# Patient Record
Sex: FEMALE | Race: BLACK OR AFRICAN AMERICAN | Employment: OTHER | URBAN - METROPOLITAN AREA
[De-identification: names, ages, dates, MRNs, and addresses within clinical notes are randomized per-mention and may not be internally consistent; named-entity substitution may affect disease eponyms.]

---

## 2018-09-09 ENCOUNTER — APPOINTMENT (OUTPATIENT)
Dept: GENERAL RADIOLOGY | Age: 63
End: 2018-09-09
Attending: PHYSICIAN ASSISTANT
Payer: MEDICARE

## 2018-09-09 ENCOUNTER — HOSPITAL ENCOUNTER (EMERGENCY)
Age: 63
Discharge: HOME | End: 2018-09-09
Attending: STUDENT IN AN ORGANIZED HEALTH CARE EDUCATION/TRAINING PROGRAM
Payer: MEDICARE

## 2018-09-09 VITALS
BODY MASS INDEX: 30.63 KG/M2 | HEIGHT: 60 IN | RESPIRATION RATE: 18 BRPM | SYSTOLIC BLOOD PRESSURE: 137 MMHG | TEMPERATURE: 98.2 F | HEART RATE: 89 BPM | DIASTOLIC BLOOD PRESSURE: 85 MMHG | WEIGHT: 156 LBS | OXYGEN SATURATION: 94 %

## 2018-09-09 DIAGNOSIS — M54.32 SCIATICA OF LEFT SIDE: Primary | ICD-10-CM

## 2018-09-09 PROCEDURE — 72100 X-RAY EXAM L-S SPINE 2/3 VWS: CPT

## 2018-09-09 PROCEDURE — 74011250636 HC RX REV CODE- 250/636: Performed by: STUDENT IN AN ORGANIZED HEALTH CARE EDUCATION/TRAINING PROGRAM

## 2018-09-09 PROCEDURE — 73502 X-RAY EXAM HIP UNI 2-3 VIEWS: CPT

## 2018-09-09 PROCEDURE — 96372 THER/PROPH/DIAG INJ SC/IM: CPT

## 2018-09-09 PROCEDURE — 74011250637 HC RX REV CODE- 250/637: Performed by: STUDENT IN AN ORGANIZED HEALTH CARE EDUCATION/TRAINING PROGRAM

## 2018-09-09 PROCEDURE — 99283 EMERGENCY DEPT VISIT LOW MDM: CPT

## 2018-09-09 RX ORDER — ACETAMINOPHEN 500 MG
1000 TABLET ORAL ONCE
Status: COMPLETED | OUTPATIENT
Start: 2018-09-09 | End: 2018-09-09

## 2018-09-09 RX ORDER — FENTANYL CITRATE 50 UG/ML
100 INJECTION, SOLUTION INTRAMUSCULAR; INTRAVENOUS
Status: COMPLETED | OUTPATIENT
Start: 2018-09-09 | End: 2018-09-09

## 2018-09-09 RX ORDER — HYDROCODONE BITARTRATE AND ACETAMINOPHEN 7.5; 325 MG/1; MG/1
1 TABLET ORAL
Qty: 6 TAB | Refills: 0 | Status: SHIPPED | OUTPATIENT
Start: 2018-09-09 | End: 2019-10-07

## 2018-09-09 RX ADMIN — FENTANYL CITRATE 100 MCG: 50 INJECTION, SOLUTION INTRAMUSCULAR; INTRAVENOUS at 18:41

## 2018-09-09 RX ADMIN — ACETAMINOPHEN 1000 MG: 500 TABLET ORAL at 18:41

## 2018-09-09 NOTE — ED TRIAGE NOTES
Patient arrives c/o LEFT hip/groin and leg pain, starting Friday, denies injury.   Denies redness/swelling, denies CP/SOB

## 2018-09-09 NOTE — ED PROVIDER NOTES
HPI Comments: 65 y.o. female with past medical history significant for pancreatitis, diabetes, and HTN who presents from home with chief complaint of hip pain. Patient states that she began feeling constant pain in her left hip and left buttocks that radiated throughout her left leg 2 days ago. She notes that her pain has become progressively worse and describes her symptoms as a persistent \"throbbing\" sensation with \"soreness\" in her left leg. Patient complains that her pain prevents her from being able to \"sit on the toilet seat. \"  She notes aggravation of her symptoms with ambulation and denies relief when lying down. Patient has taken Tramadol for her symptoms with no relief. She claims to have a h/o arthritis and to have had similar symptoms previously. Of note, patient recently returned from a road trip to the [place]. She denies any recent accidents or falls, fever, chills, abdominal pain, dysuria, incontinence, back pain, chest pain, and headaches. There are no other acute medical concerns at this time. Social hx: former tobacco use; negative alcohol use; negative drug use  PCP: Ginny Bob MD    Note written by Carmine Suggs, as dictated by [place] MD 6:01 PM        The history is provided by the patient. No  was used. Past Medical History:   Diagnosis Date    Diabetes ([address]     Hypertension     Pancreatitis        Past Surgical History:   Procedure Laterality Date    HX CHOLECYSTECTOMY      HX HERNIA REPAIR      HX HYSTERECTOMY      HX ORTHOPAEDIC      right shoulder    HX ORTHOPAEDIC      left shoulder    HX TUBAL LIGATION      NEUROLOGICAL PROCEDURE UNLISTED      cervical fusion         History reviewed. No pertinent family history. Social History     Social History    Marital status: SINGLE     Spouse name: N/A    Number of children: N/A    Years of education: N/A     Occupational History    Not on file.      Social History Main Topics    Smoking status: Former Smoker     Packs/day: 0.50    Smokeless tobacco: Never Used    Alcohol use No    Drug use: No    Sexual activity: Not on file     Other Topics Concern    Not on file     Social History Narrative         ALLERGIES: Latex and Morphine    Review of Systems   Constitutional: Negative for chills and fever. HENT: Negative for sore throat. Respiratory: Negative for cough and shortness of breath. Cardiovascular: Negative for chest pain. Gastrointestinal: Negative for abdominal pain and vomiting. Genitourinary: Negative for dysuria. Musculoskeletal: Positive for arthralgias (left hip pain) and gait problem. Negative for back pain. Skin: Negative for rash. Neurological: Negative for syncope and headaches. Psychiatric/Behavioral: Negative for confusion. All other systems reviewed and are negative. Vitals:    09/09/18 1709   BP: 157/86   Pulse: (!) 106   Resp: 18   Temp: 98.2 °F (36.8 °C)   SpO2: 97%   Weight: 70.8 kg (156 lb)   Height: 5' (1.524 m)            Physical Exam   Constitutional: She is oriented to person, place, and time. She appears well-developed. No distress. HENT:   Head: Normocephalic and atraumatic. Eyes: Conjunctivae and EOM are normal. Pupils are equal, round, and reactive to light. Neck: Normal range of motion. Neck supple. Cardiovascular: Normal rate, regular rhythm and normal heart sounds. No murmur heard. Pulmonary/Chest: Effort normal and breath sounds normal. No respiratory distress. Abdominal: Soft. Bowel sounds are normal. She exhibits no distension. There is no tenderness. There is no rebound. Musculoskeletal: Normal range of motion. She exhibits no edema. Left buttock and paralumbar muscular tenderness to palpation; no saddle anesthesia    Neurological: She is alert and oriented to person, place, and time. She has normal strength. No cranial nerve deficit. She exhibits normal muscle tone.  Coordination normal. Normal motor strength in all extremities   Skin: Skin is warm and dry. No rash noted. Psychiatric: She has a normal mood and affect. Her behavior is normal.   Nursing note and vitals reviewed. Note written by Carmine Chavez, as dictated by Chaz Zavala MD 6:01 PM      [facility]      ED Course       Procedures    The patient presented with hip and back pain. The patient is now resting comfortably and feels better, is alert, talkative, interactive and in no distress. The repeat examination is unremarkable and benign. The patient is neurologically intact and is ambulatory in the ED. The patient has no fever, no bowel or bladder incontinence, no saddle anesthesia, and is otherwise alert and well-appearing. The history, physical examination, and diagnostics (if any) do not suggest the presence of acute fracture, dislocation, spinal epidural abscess, acute spinal epidural bleed, cauda equina syndrome, abdominal aortic aneurysm, aortic dissection or other process requiring further testing, treatment or consultation in the emergency department. The vital signs have been stable. The patient's condition is stable and appropriate for discharge. The patient will pursue further outpatient evaluation with the primary care physician or other designated or consulting physician as indicated in the discharge instructions.

## 2018-09-09 NOTE — DISCHARGE INSTRUCTIONS
Sciatica: Care Instructions  Your Care Instructions    Sciatica (say \"yet-MC-yz-kuh\") is an irritation of one of the sciatic nerves, which come from the spinal cord in the lower back. The sciatic nerves and their branches extend down through the buttock to the foot. Sciatica can develop when an injured disc in the back presses against a spinal nerve root. Its main symptom is pain, numbness, or weakness that is often worse in the leg or foot than in the back. Sciatica often will improve and go away with time. Early treatment usually includes medicines and exercises to relieve pain. Follow-up care is a key part of your treatment and safety. Be sure to make and go to all appointments, and call your doctor if you are having problems. It's also a good idea to know your test results and keep a list of the medicines you take. How can you care for yourself at home? · Take pain medicines exactly as directed. ¨ If the doctor gave you a prescription medicine for pain, take it as prescribed. ¨ If you are not taking a prescription pain medicine, ask your doctor if you can take an over-the-counter medicine. · Use heat or ice to relieve pain. ¨ To apply heat, put a warm water bottle, heating pad set on low, or warm cloth on your back. Do not go to sleep with a heating pad on your skin. ¨ To use ice, put ice or a cold pack on the area for 10 to 20 minutes at a time. Put a thin cloth between the ice and your skin. · Avoid sitting if possible, unless it feels better than standing. · Alternate lying down with short walks. Increase your walking distance as you are able to without making your symptoms worse. · Do not do anything that makes your symptoms worse. When should you call for help? Call 911 anytime you think you may need emergency care.  For example, call if:    · You are unable to move a leg at all.   [facility] your doctor now or seek immediate medical care if:    · You have new or worse symptoms in your legs or buttocks. Symptoms may include:  ¨ Numbness or tingling. ¨ Weakness. ¨ Pain.     · You lose bladder or bowel control.    Watch closely for changes in your health, and be sure to contact your doctor if:    · You are not getting better as expected. Where can you learn more? Go to http://vincent-marlo.info/. Enter [phone] in the search box to learn more about \"Sciatica: Care Instructions. \"  Current as of: November 29, 2017  Content Version: 11.7  © 1286-6762 [employer] Care instructions adapted under license by [employer] (which disclaims liability or warranty for this information). If you have questions about a medical condition or this instruction, always ask your healthcare professional. [address] any warranty or liability for your use of this information.

## 2018-09-09 NOTE — ED NOTES
5:07 PM 
I have evaluated the patient as the Provider in Triage. I have reviewed Her vital signs and the triage nurse assessment. I have talked with the patient and any available family and advised that I am the provider in triage and have ordered the appropriate study to initiate their work up based on the clinical presentation during my assessment. I have advised that the patient will be accommodated in the Main ED as soon as possible. I have also requested to contact the triage nurse or myself immediately if the patient experiences any changes in their condition during this brief waiting period. Hx of sciatica, presents with left hip, buttock pain occasionally radiates down posterior thigh since Friday. Denies fall, injury or leg swelling. Pain with standing or sitting on the commode. Denies numbness in groin or leg. Has pain with ambulating and requires a cane or assistance since Fri.  
Roland Olivas PA-C

## 2019-10-07 ENCOUNTER — HOSPITAL ENCOUNTER (INPATIENT)
Age: 64
LOS: 4 days | Discharge: HOME | DRG: 392 | End: 2019-10-11
Attending: EMERGENCY MEDICINE | Admitting: INTERNAL MEDICINE
Payer: MEDICARE

## 2019-10-07 ENCOUNTER — APPOINTMENT (OUTPATIENT)
Dept: CT IMAGING | Age: 64
DRG: 392 | End: 2019-10-07
Attending: PHYSICIAN ASSISTANT
Payer: MEDICARE

## 2019-10-07 DIAGNOSIS — R10.13 ABDOMINAL PAIN, EPIGASTRIC: Primary | ICD-10-CM

## 2019-10-07 DIAGNOSIS — K63.9 COLON WALL THICKENING: ICD-10-CM

## 2019-10-07 LAB
ALBUMIN SERPL-MCNC: 3.5 G/DL (ref 3.5–5)
ALBUMIN/GLOB SERPL: 0.6 {RATIO} (ref 1.1–2.2)
ALP SERPL-CCNC: 139 U/L (ref 45–117)
ALT SERPL-CCNC: 92 U/L (ref 12–78)
AMYLASE SERPL-CCNC: 75 U/L (ref 25–115)
ANION GAP SERPL CALC-SCNC: 6 MMOL/L (ref 5–15)
APPEARANCE UR: CLEAR
AST SERPL-CCNC: 112 U/L (ref 15–37)
BACTERIA URNS QL MICRO: NEGATIVE /HPF
BASOPHILS # BLD: 0.1 K/UL (ref 0–0.1)
BASOPHILS NFR BLD: 1 % (ref 0–1)
BILIRUB SERPL-MCNC: 0.5 MG/DL (ref 0.2–1)
BILIRUB UR QL: NEGATIVE
BUN SERPL-MCNC: 11 MG/DL (ref 6–20)
BUN/CREAT SERPL: 16 (ref 12–20)
CALCIUM SERPL-MCNC: 9.4 MG/DL (ref 8.5–10.1)
CHLORIDE SERPL-SCNC: 100 MMOL/L (ref 97–108)
CO2 SERPL-SCNC: 28 MMOL/L (ref 21–32)
COLOR UR: NORMAL
COMMENT, HOLDF: NORMAL
CREAT SERPL-MCNC: 0.7 MG/DL (ref 0.55–1.02)
DIFFERENTIAL METHOD BLD: ABNORMAL
EOSINOPHIL # BLD: 0.1 K/UL (ref 0–0.4)
EOSINOPHIL NFR BLD: 2 % (ref 0–7)
EPITH CASTS URNS QL MICRO: NORMAL /LPF
ERYTHROCYTE [DISTWIDTH] IN BLOOD BY AUTOMATED COUNT: 13.8 % (ref 11.5–14.5)
EST. AVERAGE GLUCOSE BLD GHB EST-MCNC: 134 MG/DL
GLOBULIN SER CALC-MCNC: 5.4 G/DL (ref 2–4)
GLUCOSE BLD STRIP.AUTO-MCNC: 99 MG/DL (ref 65–100)
GLUCOSE SERPL-MCNC: 121 MG/DL (ref 65–100)
GLUCOSE UR STRIP.AUTO-MCNC: NEGATIVE MG/DL
HBA1C MFR BLD: 6.3 % (ref 4.2–6.3)
HCT VFR BLD AUTO: 44.8 % (ref 35–47)
HGB BLD-MCNC: 14.2 G/DL (ref 11.5–16)
HGB UR QL STRIP: NEGATIVE
HYALINE CASTS URNS QL MICRO: NORMAL /LPF (ref 0–5)
IMM GRANULOCYTES # BLD AUTO: 0 K/UL
IMM GRANULOCYTES NFR BLD AUTO: 0 %
KETONES UR QL STRIP.AUTO: NEGATIVE MG/DL
LEUKOCYTE ESTERASE UR QL STRIP.AUTO: NEGATIVE
LIPASE SERPL-CCNC: 293 U/L (ref 73–393)
LYMPHOCYTES # BLD: 3.7 K/UL (ref 0.8–3.5)
LYMPHOCYTES NFR BLD: 76 % (ref 12–49)
MCH RBC QN AUTO: 28.4 PG (ref 26–34)
MCHC RBC AUTO-ENTMCNC: 31.7 G/DL (ref 30–36.5)
MCV RBC AUTO: 89.6 FL (ref 80–99)
MONOCYTES # BLD: 0.2 K/UL (ref 0–1)
MONOCYTES NFR BLD: 4 % (ref 5–13)
NEUTS SEG # BLD: 0.8 K/UL (ref 1.8–8)
NEUTS SEG NFR BLD: 17 % (ref 32–75)
NITRITE UR QL STRIP.AUTO: NEGATIVE
NRBC # BLD: 0 K/UL (ref 0–0.01)
NRBC BLD-RTO: 0 PER 100 WBC
PH UR STRIP: 6.5 [PH] (ref 5–8)
PLATELET # BLD AUTO: 185 K/UL (ref 150–400)
PMV BLD AUTO: 9.9 FL (ref 8.9–12.9)
POTASSIUM SERPL-SCNC: 4.2 MMOL/L (ref 3.5–5.1)
PROT SERPL-MCNC: 8.9 G/DL (ref 6.4–8.2)
PROT UR STRIP-MCNC: NEGATIVE MG/DL
RBC # BLD AUTO: 5 M/UL (ref 3.8–5.2)
RBC #/AREA URNS HPF: NORMAL /HPF (ref 0–5)
RBC MORPH BLD: ABNORMAL
SAMPLES BEING HELD,HOLD: NORMAL
SERVICE CMNT-IMP: NORMAL
SODIUM SERPL-SCNC: 134 MMOL/L (ref 136–145)
SP GR UR REFRACTOMETRY: 1.01 (ref 1–1.03)
TROPONIN I SERPL-MCNC: <0.05 NG/ML
UR CULT HOLD, URHOLD: NORMAL
UROBILINOGEN UR QL STRIP.AUTO: 1 EU/DL (ref 0.2–1)
WBC # BLD AUTO: 4.9 K/UL (ref 3.6–11)
WBC MORPH BLD: ABNORMAL
WBC URNS QL MICRO: NORMAL /HPF (ref 0–4)

## 2019-10-07 PROCEDURE — 74011636320 HC RX REV CODE- 636/320: Performed by: RADIOLOGY

## 2019-10-07 PROCEDURE — 93005 ELECTROCARDIOGRAM TRACING: CPT

## 2019-10-07 PROCEDURE — 74011250637 HC RX REV CODE- 250/637: Performed by: INTERNAL MEDICINE

## 2019-10-07 PROCEDURE — 84484 ASSAY OF TROPONIN QUANT: CPT

## 2019-10-07 PROCEDURE — 82150 ASSAY OF AMYLASE: CPT

## 2019-10-07 PROCEDURE — 82962 GLUCOSE BLOOD TEST: CPT

## 2019-10-07 PROCEDURE — 74011250636 HC RX REV CODE- 250/636: Performed by: INTERNAL MEDICINE

## 2019-10-07 PROCEDURE — 99284 EMERGENCY DEPT VISIT MOD MDM: CPT

## 2019-10-07 PROCEDURE — 36415 COLL VENOUS BLD VENIPUNCTURE: CPT

## 2019-10-07 PROCEDURE — 96375 TX/PRO/DX INJ NEW DRUG ADDON: CPT

## 2019-10-07 PROCEDURE — 65270000032 HC RM SEMIPRIVATE

## 2019-10-07 PROCEDURE — 74011250636 HC RX REV CODE- 250/636: Performed by: PHYSICIAN ASSISTANT

## 2019-10-07 PROCEDURE — 81001 URINALYSIS AUTO W/SCOPE: CPT

## 2019-10-07 PROCEDURE — 96374 THER/PROPH/DIAG INJ IV PUSH: CPT

## 2019-10-07 PROCEDURE — 74011000258 HC RX REV CODE- 258: Performed by: RADIOLOGY

## 2019-10-07 PROCEDURE — 83690 ASSAY OF LIPASE: CPT

## 2019-10-07 PROCEDURE — 74177 CT ABD & PELVIS W/CONTRAST: CPT

## 2019-10-07 PROCEDURE — 83036 HEMOGLOBIN GLYCOSYLATED A1C: CPT

## 2019-10-07 PROCEDURE — 74011250636 HC RX REV CODE- 250/636: Performed by: NURSE PRACTITIONER

## 2019-10-07 PROCEDURE — 80053 COMPREHEN METABOLIC PANEL: CPT

## 2019-10-07 PROCEDURE — 85025 COMPLETE CBC W/AUTO DIFF WBC: CPT

## 2019-10-07 RX ORDER — ASPIRIN 81 MG/1
81 TABLET ORAL DAILY
COMMUNITY

## 2019-10-07 RX ORDER — SODIUM CHLORIDE 0.9 % (FLUSH) 0.9 %
5-40 SYRINGE (ML) INJECTION AS NEEDED
Status: DISCONTINUED | OUTPATIENT
Start: 2019-10-07 | End: 2019-10-11 | Stop reason: HOSPADM

## 2019-10-07 RX ORDER — METOPROLOL SUCCINATE 25 MG/1
25 TABLET, EXTENDED RELEASE ORAL DAILY
COMMUNITY

## 2019-10-07 RX ORDER — SODIUM CHLORIDE 0.9 % (FLUSH) 0.9 %
10 SYRINGE (ML) INJECTION
Status: COMPLETED | OUTPATIENT
Start: 2019-10-07 | End: 2019-10-07

## 2019-10-07 RX ORDER — LANOLIN ALCOHOL/MO/W.PET/CERES
400 CREAM (GRAM) TOPICAL 2 TIMES DAILY
COMMUNITY

## 2019-10-07 RX ORDER — CLOPIDOGREL BISULFATE 75 MG/1
75 TABLET ORAL DAILY
Status: DISCONTINUED | OUTPATIENT
Start: 2019-10-08 | End: 2019-10-11 | Stop reason: HOSPADM

## 2019-10-07 RX ORDER — FENTANYL CITRATE 50 UG/ML
25 INJECTION, SOLUTION INTRAMUSCULAR; INTRAVENOUS
Status: DISCONTINUED | OUTPATIENT
Start: 2019-10-07 | End: 2019-10-08

## 2019-10-07 RX ORDER — ASPIRIN 81 MG/1
81 TABLET ORAL DAILY
Status: DISCONTINUED | OUTPATIENT
Start: 2019-10-08 | End: 2019-10-11 | Stop reason: HOSPADM

## 2019-10-07 RX ORDER — SODIUM CHLORIDE 0.9 % (FLUSH) 0.9 %
5-40 SYRINGE (ML) INJECTION EVERY 8 HOURS
Status: DISCONTINUED | OUTPATIENT
Start: 2019-10-07 | End: 2019-10-11 | Stop reason: HOSPADM

## 2019-10-07 RX ORDER — ONDANSETRON 4 MG/1
4 TABLET, ORALLY DISINTEGRATING ORAL
Status: DISCONTINUED | OUTPATIENT
Start: 2019-10-07 | End: 2019-10-11 | Stop reason: HOSPADM

## 2019-10-07 RX ORDER — ONDANSETRON 2 MG/ML
4 INJECTION INTRAMUSCULAR; INTRAVENOUS
Status: COMPLETED | OUTPATIENT
Start: 2019-10-07 | End: 2019-10-07

## 2019-10-07 RX ORDER — CLOPIDOGREL BISULFATE 75 MG/1
75 TABLET ORAL DAILY
COMMUNITY

## 2019-10-07 RX ORDER — ALBUTEROL SULFATE 90 UG/1
2 AEROSOL, METERED RESPIRATORY (INHALATION)
Status: DISCONTINUED | OUTPATIENT
Start: 2019-10-07 | End: 2019-10-11 | Stop reason: HOSPADM

## 2019-10-07 RX ORDER — PANTOPRAZOLE SODIUM 40 MG/1
40 TABLET, DELAYED RELEASE ORAL DAILY
Status: DISCONTINUED | OUTPATIENT
Start: 2019-10-08 | End: 2019-10-08

## 2019-10-07 RX ORDER — DEXTROSE MONOHYDRATE 100 MG/ML
0-250 INJECTION, SOLUTION INTRAVENOUS AS NEEDED
Status: DISCONTINUED | OUTPATIENT
Start: 2019-10-07 | End: 2019-10-11 | Stop reason: HOSPADM

## 2019-10-07 RX ORDER — METFORMIN HYDROCHLORIDE 500 MG/1
500 TABLET ORAL 2 TIMES DAILY WITH MEALS
Status: DISCONTINUED | OUTPATIENT
Start: 2019-10-07 | End: 2019-10-07

## 2019-10-07 RX ORDER — TRAMADOL HYDROCHLORIDE 50 MG/1
50 TABLET ORAL
Status: DISCONTINUED | OUTPATIENT
Start: 2019-10-07 | End: 2019-10-11 | Stop reason: HOSPADM

## 2019-10-07 RX ORDER — LISINOPRIL 20 MG/1
40 TABLET ORAL DAILY
Status: DISCONTINUED | OUTPATIENT
Start: 2019-10-08 | End: 2019-10-11 | Stop reason: HOSPADM

## 2019-10-07 RX ORDER — LANOLIN ALCOHOL/MO/W.PET/CERES
400 CREAM (GRAM) TOPICAL 2 TIMES DAILY
Status: DISCONTINUED | OUTPATIENT
Start: 2019-10-07 | End: 2019-10-11 | Stop reason: HOSPADM

## 2019-10-07 RX ORDER — SODIUM CHLORIDE 9 MG/ML
75 INJECTION, SOLUTION INTRAVENOUS CONTINUOUS
Status: DISCONTINUED | OUTPATIENT
Start: 2019-10-07 | End: 2019-10-08

## 2019-10-07 RX ORDER — FENTANYL CITRATE 50 UG/ML
50 INJECTION, SOLUTION INTRAMUSCULAR; INTRAVENOUS
Status: COMPLETED | OUTPATIENT
Start: 2019-10-07 | End: 2019-10-07

## 2019-10-07 RX ORDER — HEPARIN SODIUM 5000 [USP'U]/ML
5000 INJECTION, SOLUTION INTRAVENOUS; SUBCUTANEOUS EVERY 8 HOURS
Status: DISCONTINUED | OUTPATIENT
Start: 2019-10-07 | End: 2019-10-11 | Stop reason: HOSPADM

## 2019-10-07 RX ORDER — MAGNESIUM SULFATE 100 %
4 CRYSTALS MISCELLANEOUS AS NEEDED
Status: DISCONTINUED | OUTPATIENT
Start: 2019-10-07 | End: 2019-10-11 | Stop reason: HOSPADM

## 2019-10-07 RX ORDER — PANTOPRAZOLE SODIUM 40 MG/1
40 TABLET, DELAYED RELEASE ORAL DAILY
Status: ON HOLD | COMMUNITY
End: 2019-10-11 | Stop reason: SDUPTHER

## 2019-10-07 RX ORDER — METOPROLOL SUCCINATE 25 MG/1
25 TABLET, EXTENDED RELEASE ORAL DAILY
Status: DISCONTINUED | OUTPATIENT
Start: 2019-10-08 | End: 2019-10-11 | Stop reason: HOSPADM

## 2019-10-07 RX ORDER — INSULIN LISPRO 100 [IU]/ML
INJECTION, SOLUTION INTRAVENOUS; SUBCUTANEOUS
Status: DISCONTINUED | OUTPATIENT
Start: 2019-10-07 | End: 2019-10-11 | Stop reason: HOSPADM

## 2019-10-07 RX ADMIN — HEPARIN SODIUM 5000 UNITS: 5000 INJECTION INTRAVENOUS; SUBCUTANEOUS at 20:42

## 2019-10-07 RX ADMIN — Medication 10 ML: at 15:31

## 2019-10-07 RX ADMIN — SODIUM CHLORIDE 1000 ML: 900 INJECTION, SOLUTION INTRAVENOUS at 13:31

## 2019-10-07 RX ADMIN — SODIUM CHLORIDE 75 ML/HR: 900 INJECTION, SOLUTION INTRAVENOUS at 23:19

## 2019-10-07 RX ADMIN — FENTANYL CITRATE 50 MCG: 50 INJECTION INTRAMUSCULAR; INTRAVENOUS at 17:32

## 2019-10-07 RX ADMIN — IOPAMIDOL 100 ML: 755 INJECTION, SOLUTION INTRAVENOUS at 15:31

## 2019-10-07 RX ADMIN — FENTANYL CITRATE 50 MCG: 50 INJECTION INTRAMUSCULAR; INTRAVENOUS at 13:31

## 2019-10-07 RX ADMIN — SODIUM CHLORIDE 100 ML: 900 INJECTION, SOLUTION INTRAVENOUS at 15:32

## 2019-10-07 RX ADMIN — TRAMADOL HYDROCHLORIDE 50 MG: 50 TABLET ORAL at 23:19

## 2019-10-07 RX ADMIN — FENTANYL CITRATE 25 MCG: 50 INJECTION INTRAMUSCULAR; INTRAVENOUS at 21:45

## 2019-10-07 RX ADMIN — MAGNESIUM OXIDE TAB 400 MG (241.3 MG ELEMENTAL MG) 400 MG: 400 (241.3 MG) TAB at 20:43

## 2019-10-07 RX ADMIN — ONDANSETRON 4 MG: 2 INJECTION INTRAMUSCULAR; INTRAVENOUS at 13:36

## 2019-10-07 NOTE — PROGRESS NOTES
Admission Medication Reconciliation:    Information obtained from:  patient, medication bottles, chart review  RxQuery data available¹:  YES    Comments/Recommendations: All medications/allergies have been reviewed and updated; last medication administration times reviewed and recorded. The patient brought all her medication bottles with her to the hospital which were used to update medications. Note, patient reports he blood glucose has been running low; therefore, she has not been regularly taking the metformin as it causes blood glucose to go too low. Patient also reports she no longer has albuterol inhaler and needs new prescription. Changes made to Prior to Admission (PTA) Medication List:   ?   Medications Added:   - aspirin, clopidogrel, Creon, metoprolol, magnesium, protonix  ? Medications Changed:   - None   ? Medications Removed:   - amlodipine 10 mg daily  - esomeprazole 40 mg daily  - HCTZ 25 mg daily  - hydrocodone/APAP 7.5/325 mg Q6H PRN  - multivitamin     ¹RxQuery pharmacy benefit data reflects medications filled and processed through the patient's insurance, however   this data does NOT capture whether the medication was picked up or is currently being taken by the patient. Allergies:  Latex and Morphine    Significant PMH/Disease States:   Past Medical History:   Diagnosis Date    Diabetes ([facility] [address]     Hypertension     Pancreatitis        Chief Complaint for this Admission:    Chief Complaint   Patient presents with    Back Pain    Abdominal Pain       Prior to Admission Medications:   Prior to Admission Medications   Prescriptions Last Dose Informant Patient Reported? Taking? albuterol (PROVENTIL HFA, VENTOLIN HFA) 90 mcg/actuation inhaler Not Taking at Unknown time  No No   Sig: Take 2 puffs by inhalation every four (4) hours as needed for Wheezing. aspirin delayed-release 81 mg tablet 10/7/2019 at Unknown time  Yes Yes   Sig: Take 81 mg by mouth daily.    clopidogrel (PLAVIX) 75 mg tab 10/7/2019 at Unknown time  Yes Yes   Sig: Take 75 mg by mouth daily. lipase-protease-amylase (CREON) 36,000-114,000- 180,000 unit cpDR capsule   Yes Yes   Sig: Take 2 Caps by mouth three (3) times daily (with meals). lipase-protease-amylase (CREON) 36,000-114,000- 180,000 unit cpDR capsule   Yes Yes   Sig: Take 1 Cap by mouth as needed (snacks). lisinopril (PRINIVIL, ZESTRIL) 40 mg tablet 10/7/2019 at Unknown time  Yes Yes   Sig: Take 40 mg by mouth daily. magnesium oxide (MAG-OX) 400 mg tablet 10/7/2019 at Unknown time  Yes Yes   Sig: Take 400 mg by mouth two (2) times a day. metFORMIN (GLUCOPHAGE) 500 mg tablet Not Taking at Unknown time  Yes No   Sig: Take 500 mg by mouth two (2) times daily (with meals). metoprolol succinate (TOPROL-XL) 25 mg XL tablet 10/7/2019 at Unknown time  Yes Yes   Sig: Take 25 mg by mouth daily. ondansetron (ZOFRAN ODT) 4 mg disintegrating tablet   No No   Sig: Take 1 Tab by mouth every eight (8) hours as needed for Nausea. pantoprazole (PROTONIX) 40 mg tablet 10/7/2019 at Unknown time  Yes Yes   Sig: Take 40 mg by mouth daily. traMADol (ULTRAM) 50 mg tablet   Yes No   Sig: Take 50 mg by mouth every six (6) hours as needed for Pain. Facility-Administered Medications: None       Thank you for allowing pharmacy to participate in the coordination of this patient's care. If you have any other questions, please contact the medication reconciliation pharmacist at [phone]. Polina Dahl, Pharm. D., [facility]

## 2019-10-07 NOTE — ED TRIAGE NOTES
Patient arrives c/o abdominal and back pain x couple weeks. +nausea. Patient has been diagnosed with pancreatitis and states she is just tired of the pain.

## 2019-10-07 NOTE — H&P
Hospitalist History and Physical  Shon Ohara MD  Answering service: [phone] from in house phone        Date of Service:  10/7/2019  NAME:  Osmin Noble  :  1955  MRN:  [id number]  Primary Care Provider: Mendy Whalen MD    Chief Complaint:   Chief Complaint   Patient presents with    Back Pain    Abdominal Pain       History of Present Illness: Osmin Noble is a 59 y.o. female   As per ED Initia;l history     73-year-old -American female with medical history significant for diabetes mellitus, hypertension and pancreatitis presenting ambulatory to the emergency department with chief complaint of recurrent epigastric, left upper quadrant back pain, described as constant, described as similar to prior episode of pancreatitis. There is associated nausea and vomiting. Pain is exacerbated with palpation of the area and p.o. intake. He was prescribed tramadol but on her last admission for pancreatitis from [facility], which has provided minimal resolution of pain. She denies any EtOH intake. Denies any associated fever, chills, headache, dizziness, chest pain, constipation, dysuria, urinary frequency or urgency. She has noticed some diarrhea. She also reports feeling short of breath intermittently. States pain has been progressively worsening.     GI: Micky, also has seen at [facility] for chronic pancreatitis    D/w Dr. Jackie Bowen who recommends consulting GI due to CT finding for further recommendation / management. Patient has seen Dr. Love Niño in the past, poor historian, states only seen him for \"a bowel blockage, maybe, and pancreatitis\". Also seen at [facility], wishes to see Dr. Love Niño for GI f/u for this problem so will consult GI. Still denies blood in stool, black stool, vomiting, diarrhea. Has Tramadol at home prescribed #30 day supply on 19.        Dr. Love Niño recommends admission due to hx of chronic pancreatitis and intractable pain.  States clear liquids, maybe colonoscopy in 1-2 days. On my H&P, patient reported weight loss of 30 pounds in last few months, her pain is constant and diffuse, She is having this pain for the last 6 months. No nausea or vomiting and she has not reported any bleeding per rectum. No fever or chills or any other symptoms     Chart reviewed at length. Review of Systems:  Pertinent positives noted in HPI. All other systems were reviewed and are negative. Past Medical and surgical history:   Past Medical History:   Diagnosis Date    Diabetes ([facility] [place] [address]     Hypertension     Pancreatitis       Past Surgical History:   Procedure Laterality Date    HX CHOLECYSTECTOMY      HX HERNIA REPAIR      HX HYSTERECTOMY      HX ORTHOPAEDIC      right shoulder    HX ORTHOPAEDIC      left shoulder    HX TUBAL LIGATION      NEUROLOGICAL PROCEDURE UNLISTED      cervical fusion       Home medications:  Prior to Admission medications    Medication Sig Start Date End Date Taking? Authorizing Provider   HYDROcodone-acetaminophen (NORCO) 7.5-325 mg per tablet Take 1 Tab by mouth every six (6) hours as needed for Pain. Max Daily Amount: 4 Tabs. 9/9/18   Jose Doherty MD   multivitamin (ONE A DAY) tablet Take 1 Tab by mouth daily. Provider, Historical   traMADol (ULTRAM) 50 mg tablet Take 50 mg by mouth every six (6) hours as needed for Pain. Provider, Historical   albuterol (PROVENTIL HFA, VENTOLIN HFA) 90 mcg/actuation inhaler Take 2 puffs by inhalation every four (4) hours as needed for Wheezing. 9/16/14   Hoda Proctor MD   diazepam (VALIUM) 5 mg tablet Take 1 Tab by mouth every eight (8) hours as needed (spasm). 4/28/14   DEVIN Plasencia   amLODIPine (NORVASC) 10 mg tablet Take 10 mg by mouth daily. Provider, Historical   esomeprazole (NEXIUM) 40 mg capsule Take 40 mg by mouth two (2) times a day. Provider, Historical   lisinopril (PRINIVIL, ZESTRIL) 40 mg tablet Take 40 mg by mouth daily.     Provider, Historical   metFORMIN (GLUCOPHAGE) 500 mg tablet Take 500 mg by mouth two (2) times daily (with meals). Provider, Yuan   hydrochlorothiazide (HYDRODIURIL) 25 mg tablet Take 25 mg by mouth daily. Other, MD Peyman   ondansetron (ZOFRAN ODT) 4 mg disintegrating tablet Take 1 Tab by mouth every eight (8) hours as needed for Nausea. 11/7/13   Conrad Becerril MD       Allergies: Allergies   Allergen Reactions    Latex Hives    Morphine Hives       Family history:   History reviewed. No pertinent family history. SOCIAL HISTORY:  Patient resides at [facility]. Patient ambulates without help. Smoking history: reports that she has quit smoking. She smoked 0.50 packs per day. She has never used smokeless tobacco.  Drug History:  reports that she does not use drugs. Alcohol history:  reports that she does not drink alcohol. Objective:       Physical Exam:   Visit Vitals  /88 (BP 1 Location: Left arm, BP Patient Position: At rest;Sitting)   Pulse 81   Temp 97.9 °F (36.6 °C)   Resp 18   Ht 5' (1.524 m)   Wt 70.8 kg (156 lb)   SpO2 100%   BMI 30.47 kg/m²     General:  Alert, cooperative, no distress, appears stated age. Head:  Normocephalic, without obvious abnormality, atraumatic. Lungs:   Clear to auscultation bilaterally. Heart:  Regular rate and rhythm, S1, S2 normal, no murmur, click, rub or gallop. Abdomen:   Soft, non-tender. Bowel sounds normal. No masses,  No organomegaly. Extremities: Extremities normal, atraumatic, no cyanosis or edema. Pulses: 2+ and symmetric all extremities. Skin: Skin color, texture, turgor normal. No rashes or lesions. ECG:  normal EKG, normal sinus rhythm, unchanged from previous tracings     Laboratory and other diagnostic Data Review: All diagnostic labs and studies have been reviewed. Ct Abd Pelv W Cont    Result Date: 10/7/2019  EXAM: CT ABDOMEN PELVIS WITH CONTRAST INDICATION: Epigastric pain. Left-sided pain. COMPARISON: 3/15/2016. CONTRAST: 100 mL of Isovue-370.  TECHNIQUE: Multislice helical CT was performed from the diaphragm to the symphysis pubis during uneventful rapid bolus intravenous contrast administration. Oral contrast was not administered. Contiguous 5 mm axial images were reconstructed and lung and soft tissue windows were generated. Coronal and sagittal reformations were generated. CT dose reduction was achieved through use of a standardized protocol tailored for this examination and automatic exposure control for dose modulation. Adaptive statistical iterative reconstruction (ASIR) was utilized for this examination. FINDINGS: LOWER CHEST: The visualized portions of the lung bases are clear. ABDOMEN: Liver: The liver is normal in size and contour with no focal abnormality. Gallbladder and bile ducts: There are clips in the gallbladder fossa and the gallbladder is absent. There is no biliary ductal patient. Spleen: No abnormality. Pancreas: No abnormality. Adrenal glands: No abnormality. Kidneys: There is a right renal cortical scar and a small left renal cyst which are unchanged. PELVIS: Reproductive organs: The uterus is absent. The ovaries are not identified. Bladder: No abnormality. BOWEL AND MESENTERY: The small bowel is normal.  There is no mesenteric mass or adenopathy. The appendix is not identified. There is a focal area of circumferential mural thickening in the distal transverse colon. PERITONEUM: There is no ascites or free intraperitoneal air. RETROPERITONEUM: The aorta are scribed atherosclerotic. There is no retroperitoneal adenopathy or mass. There is no pelvic mass or adenopathy. BONES AND SOFT TISSUES: There are anchors in the anterior abdominal wall in the infraumbilical region from previous abdominal wall surgery. There is no recurrent hernia. IMPRESSION: 1. Focal circumferential mural thickening of the distal transverse colon. This should be further evaluated. 2. Status post cholecystectomy.  3. Small left renal cyst. Small right renal cortical scar. 4. Atherosclerotic abdominal aorta without aneurysm. 5. Status post hysterectomy. 6. Status post anterior abdominal wall hernia repair. Patient Vitals for the past 12 hrs:   Temp Pulse Resp BP SpO2   10/07/19 1555 97.9 °F (36.6 °C) 81 18 150/88 100 %   10/07/19 1254 98.5 °F (36.9 °C) 100 20 (!) 129/91 99 %   10/07/19 1158 -- (!) 102 -- -- 98 %       Recent Labs     10/07/19  1324   WBC 4.9   HGB 14.2   HCT 44.8        Recent Labs     10/07/19  1324   *   K 4.2      CO2 28   BUN 11   CREA 0.70   *   CA 9.4     Recent Labs     10/07/19  1324   SGOT 112*   ALT 92*   *   TBILI 0.5   TP 8.9*   ALB 3.5   GLOB 5.4*   AML 75   LPSE 293     No results for input(s): INR, PTP, APTT, INREXT in the last 72 hours. No results for input(s): FE, TIBC, PSAT, FERR in the last 72 hours. No results found for: FOL, RBCF   No results for input(s): PH, PCO2, PO2 in the last 72 hours.   Recent Labs     10/07/19  1324   TROIQ <0.05     No results found for: CHOL, CHOLX, CHLST, CHOLV, HDL, HDLP, LDL, LDLC, DLDLP, TGLX, TRIGL, TRIGP, CHHD, CHHDX  Lab Results   Component Value Date/Time    Glucose (POC) 95 09/16/2014 01:33 PM     Lab Results   Component Value Date/Time    Color YELLOW/STRAW 10/07/2019 02:53 PM    Appearance CLEAR 10/07/2019 02:53 PM    Specific gravity 1.015 10/07/2019 02:53 PM    Specific gravity 1.005 03/14/2014 07:40 PM    pH (UA) 6.5 10/07/2019 02:53 PM    Protein NEGATIVE  10/07/2019 02:53 PM    Glucose NEGATIVE  10/07/2019 02:53 PM    Ketone NEGATIVE  10/07/2019 02:53 PM    Bilirubin NEGATIVE  10/07/2019 02:53 PM    Urobilinogen 1.0 10/07/2019 02:53 PM    Nitrites NEGATIVE  10/07/2019 02:53 PM    Leukocyte Esterase NEGATIVE  10/07/2019 02:53 PM    Epithelial cells FEW 10/07/2019 02:53 PM    Bacteria NEGATIVE  10/07/2019 02:53 PM    WBC 0-4 10/07/2019 02:53 PM    RBC 0-5 10/07/2019 02:53 PM       Assessment:   Given the patient's current clinical presentation, I have a high level of concern for decompensation if discharged from the emergency department. Complex decision making was performed, which includes reviewing the patient's available past medical records, laboratory results, and x-ray films. My assessment of this patient's clinical condition and my plan of care is as follows. Active Problems:    * No active hospital problems. *      Plan:     1. Colonic thickening  Patient has history of weight loss for last 6 months CT showed colonic thickening   Will be admitted for colonoscopy . Need to rule out colon cancer  Will order FOBT  On clear liquids   GI consulted     #Weight loss  See plan as above    #Chronic pancreatitis  Continue creon   Amylase lipase WNL      #HTN  Continue home dose Lisinopril and Metoprolol      Diet: Clear liquids  Activity: Activity as tolerated  DVT prophylaxis: sc heparin  Isolation precautions: NA  Consultations: GI  Anticipated disposition: TBD  Code status: No Order    Admit to inpatient status as anticipated LOS is more than 2 days     Patient was explained about the risk of admission including and not a complete list including risk of falls,fractures,blood clots,allergic reactions,infections. Patient/family also understands and agrees to the treatment plan including medications and side effect profiles and also understand the risk with radiation while undergoing imaging studies. The patient and the family/friends (after permission given by the patient to discuss) understand this and agree with the admission plan.        Signed By: Mona Thayer MD     10/07/19  5:26 PM        Patient's emergency contacts:  Extended Emergency Contact Information  Primary Emergency Contact: [address] Phone: [phone]  Relation: Parent

## 2019-10-07 NOTE — ED PROVIDER NOTES
72-year-old -American female with medical history significant for diabetes mellitus, hypertension and pancreatitis presenting ambulatory to the emergency department with chief complaint of recurrent epigastric, left upper quadrant back pain, described as constant, described as similar to prior episode of pancreatitis. There is associated nausea and vomiting. Pain is exacerbated with palpation of the area and p.o. intake. He was prescribed tramadol but on her last admission for pancreatitis from [facility], which has provided minimal resolution of pain. She denies any EtOH intake. Denies any associated fever, chills, headache, dizziness, chest pain, constipation, dysuria, urinary frequency or urgency. She has noticed some diarrhea. She also reports feeling short of breath intermittently. States pain has been progressively worsening. GI: Micky, also has seen at [facility] for chronic pancreatitis           Past Medical History:   Diagnosis Date    Diabetes ([address]     Hypertension     Pancreatitis        Past Surgical History:   Procedure Laterality Date    HX CHOLECYSTECTOMY      HX HERNIA REPAIR      HX HYSTERECTOMY      HX ORTHOPAEDIC      right shoulder    HX ORTHOPAEDIC      left shoulder    HX TUBAL LIGATION      NEUROLOGICAL PROCEDURE UNLISTED      cervical fusion         History reviewed. No pertinent family history.     Social History     Socioeconomic History    Marital status: SINGLE     Spouse name: Not on file    Number of children: Not on file    Years of education: Not on file    Highest education level: Not on file   Occupational History    Not on file   Social Needs    Financial resource strain: Not on file    Food insecurity:     Worry: Not on file     Inability: Not on file    Transportation needs:     Medical: Not on file     Non-medical: Not on file   Tobacco Use    Smoking status: Former Smoker     Packs/day: 0.50    Smokeless tobacco: Never Used   Substance and Sexual Activity    Alcohol use: No    Drug use: No    Sexual activity: Not on file   Lifestyle    Physical activity:     Days per week: Not on file     Minutes per session: Not on file    Stress: Not on file   Relationships    Social connections:     Talks on phone: Not on file     Gets together: Not on file     Attends [employer] service: Not on file     Active member of club or organization: Not on file     Attends meetings of clubs or organizations: Not on file     Relationship status: Not on file    Intimate partner violence:     Fear of current or ex partner: Not on file     Emotionally abused: Not on file     Physically abused: Not on file     Forced sexual activity: Not on file   Other Topics Concern    Not on file   Social History Narrative    Not on file         ALLERGIES: Latex and Morphine    Review of Systems   Constitutional: Negative. Negative for chills, fatigue and fever. HENT: Negative. Negative for congestion, ear pain, facial swelling, rhinorrhea, sneezing and sore throat. Respiratory: Negative for cough and shortness of breath. Cardiovascular: Negative. Negative for chest pain. Gastrointestinal: Positive for abdominal pain, diarrhea, nausea and vomiting. Negative for constipation. Genitourinary: Negative for difficulty urinating, frequency and urgency. Musculoskeletal: Positive for back pain. Neurological: Negative for dizziness, numbness and headaches. All other systems reviewed and are negative. Vitals:    10/07/19 1158 10/07/19 1254 10/07/19 1555   BP:  (!) 129/91 150/88   Pulse: (!) 102 100 81   Resp:  20 18   Temp:  98.5 °F (36.9 °C) 97.9 °F (36.6 °C)   SpO2: 98% 99% 100%   Weight:  70.8 kg (156 lb)    Height:  5' (1.524 m)             Physical Exam   Constitutional: She is oriented to person, place, and time. She appears well-developed and well-nourished. No distress. Thin AAF in NAD   HENT:   Head: Normocephalic and atraumatic.    Left Ear: External ear normal. Eyes: Pupils are equal, round, and reactive to light. Conjunctivae are normal.   Neck: Normal range of motion. Neck supple. Cardiovascular: Normal rate, regular rhythm and normal heart sounds. Pulmonary/Chest: Effort normal. No respiratory distress. She has no wheezes. Abdominal: Soft. Bowel sounds are normal. She exhibits no distension. There is tenderness in the epigastric area, left upper quadrant and left lower quadrant. There is no rebound. Musculoskeletal: Normal range of motion. Neurological: She is alert and oriented to person, place, and time. Skin: Skin is warm and dry. No ecchymosis, no laceration and no lesion noted. Nursing note and vitals reviewed. MDM  Number of Diagnoses or Management Options  Diagnosis management comments: 81-year-old female with history of pancreatitis presenting with complaint of epigastric, left upper back pain described as similar to previous. She appears well-hydrated and nontoxic. Abdomen is soft with significant tenderness to palpation noted on exam.  Concern for possible ACS versus pancreatitis versus diverticulitis versus obstipation versus UTI versus obstructive uropathy amongst others. Plan  CBC  CMP  Lipase  Amylase  EKG  Troponin  CT abdomen pelvis  IV fluid  Analgesia  Antiemetic therapy  Reassess       Amount and/or Complexity of Data Reviewed  Clinical lab tests: ordered and reviewed  Tests in the radiology section of CPT®: ordered and reviewed  Independent visualization of images, tracings, or specimens: yes           Procedures  Progress note           labs reviewed. Amylase and lipase nml. WBC unremarkable. Mild transaminase elevation similar to prior. Shayy Cabrera PA-C      Awaiting CT abd/pel. Pt care transferred to [facility] at end of shift.  Shayy Cabrera PA-C         reviewed, patient has \"09/24/2019- Tramadol Hcl 50 Mg Tablet #60.00 30d supply Rxd by [employer] [id number] [employer] (1068) 0 10.00 MME Comm Ins V\"    D/w Dr. Radha Mark who recommends consulting GI due to CT finding for further recommendation / management. Patient has seen Dr. Candice Powell in the past, poor historian, states only seen him for \"a bowel blockage, maybe, and pancreatitis\". Also seen at [address] [place] [address], wishes to see Dr. Candice Powell for GI f/u for this problem so will consult GI. Still denies blood in stool, black stool, vomiting, diarrhea. Has Tramadol at home prescribed #30 day supply on 9/24/19. MARCIAL Rivas Dr. recommends admission due to hx of chronic pancreatitis and intractable pain. States clear liquids, maybe colonoscopy in 1-2 days. Hoda Miller PA-C      Hospitalist Gris for Admission  5:12 PM    ED Room Number: B30/K52  Patient Name and age: Ca Rhoades 59 y.o.  female  Working Diagnosis:   1. Abdominal pain, epigastric    2. Colon wall thickening      Readmission: no  Isolation Requirements:  no  Recommended Level of Care:  med/surg  Code Status:  Full Code  Department:[facility] Adult ED - 21   Other:  Dr. Candice Powell consulted, recommends admission, will see tomorrow, will put in a note. Maybe colonoscopy in 1-2 days.

## 2019-10-08 PROBLEM — R10.9 ABDOMINAL PAIN: Status: ACTIVE | Noted: 2019-10-08

## 2019-10-08 LAB
ALBUMIN SERPL-MCNC: 3.1 G/DL (ref 3.5–5)
ALBUMIN/GLOB SERPL: 0.7 {RATIO} (ref 1.1–2.2)
ALP SERPL-CCNC: 118 U/L (ref 45–117)
ALT SERPL-CCNC: 73 U/L (ref 12–78)
ANION GAP SERPL CALC-SCNC: 7 MMOL/L (ref 5–15)
AST SERPL-CCNC: 79 U/L (ref 15–37)
ATRIAL RATE: 89 BPM
BASOPHILS # BLD: 0 K/UL (ref 0–0.1)
BASOPHILS NFR BLD: 1 % (ref 0–1)
BILIRUB SERPL-MCNC: 0.5 MG/DL (ref 0.2–1)
BUN SERPL-MCNC: 8 MG/DL (ref 6–20)
BUN/CREAT SERPL: 15 (ref 12–20)
CALCIUM SERPL-MCNC: 9.2 MG/DL (ref 8.5–10.1)
CALCULATED P AXIS, ECG09: 65 DEGREES
CALCULATED R AXIS, ECG10: -36 DEGREES
CALCULATED T AXIS, ECG11: 75 DEGREES
CHLORIDE SERPL-SCNC: 103 MMOL/L (ref 97–108)
CO2 SERPL-SCNC: 27 MMOL/L (ref 21–32)
CREAT SERPL-MCNC: 0.55 MG/DL (ref 0.55–1.02)
DIAGNOSIS, 93000: NORMAL
DIFFERENTIAL METHOD BLD: ABNORMAL
EOSINOPHIL # BLD: 0.2 K/UL (ref 0–0.4)
EOSINOPHIL NFR BLD: 4 % (ref 0–7)
ERYTHROCYTE [DISTWIDTH] IN BLOOD BY AUTOMATED COUNT: 13.5 % (ref 11.5–14.5)
GLOBULIN SER CALC-MCNC: 4.6 G/DL (ref 2–4)
GLUCOSE BLD STRIP.AUTO-MCNC: 127 MG/DL (ref 65–100)
GLUCOSE BLD STRIP.AUTO-MCNC: 151 MG/DL (ref 65–100)
GLUCOSE BLD STRIP.AUTO-MCNC: 90 MG/DL (ref 65–100)
GLUCOSE BLD STRIP.AUTO-MCNC: 91 MG/DL (ref 65–100)
GLUCOSE SERPL-MCNC: 96 MG/DL (ref 65–100)
HCT VFR BLD AUTO: 40.3 % (ref 35–47)
HGB BLD-MCNC: 12.6 G/DL (ref 11.5–16)
IMM GRANULOCYTES # BLD AUTO: 0 K/UL (ref 0–0.04)
IMM GRANULOCYTES NFR BLD AUTO: 0 % (ref 0–0.5)
LYMPHOCYTES # BLD: 2.8 K/UL (ref 0.8–3.5)
LYMPHOCYTES NFR BLD: 66 % (ref 12–49)
MCH RBC QN AUTO: 28.4 PG (ref 26–34)
MCHC RBC AUTO-ENTMCNC: 31.3 G/DL (ref 30–36.5)
MCV RBC AUTO: 90.8 FL (ref 80–99)
MONOCYTES # BLD: 0.6 K/UL (ref 0–1)
MONOCYTES NFR BLD: 13 % (ref 5–13)
NEUTS SEG # BLD: 0.7 K/UL (ref 1.8–8)
NEUTS SEG NFR BLD: 16 % (ref 32–75)
NRBC # BLD: 0 K/UL (ref 0–0.01)
NRBC BLD-RTO: 0 PER 100 WBC
P-R INTERVAL, ECG05: 174 MS
PLATELET # BLD AUTO: 152 K/UL (ref 150–400)
PMV BLD AUTO: 9.4 FL (ref 8.9–12.9)
POTASSIUM SERPL-SCNC: 3.3 MMOL/L (ref 3.5–5.1)
PROT SERPL-MCNC: 7.7 G/DL (ref 6.4–8.2)
Q-T INTERVAL, ECG07: 374 MS
QRS DURATION, ECG06: 90 MS
QTC CALCULATION (BEZET), ECG08: 455 MS
RBC # BLD AUTO: 4.44 M/UL (ref 3.8–5.2)
RBC MORPH BLD: ABNORMAL
SERVICE CMNT-IMP: ABNORMAL
SERVICE CMNT-IMP: ABNORMAL
SERVICE CMNT-IMP: NORMAL
SERVICE CMNT-IMP: NORMAL
SODIUM SERPL-SCNC: 137 MMOL/L (ref 136–145)
VENTRICULAR RATE, ECG03: 89 BPM
WBC # BLD AUTO: 4.3 K/UL (ref 3.6–11)

## 2019-10-08 PROCEDURE — 74011250637 HC RX REV CODE- 250/637: Performed by: INTERNAL MEDICINE

## 2019-10-08 PROCEDURE — 65270000032 HC RM SEMIPRIVATE

## 2019-10-08 PROCEDURE — 74011000250 HC RX REV CODE- 250: Performed by: HOSPITALIST

## 2019-10-08 PROCEDURE — 74011250636 HC RX REV CODE- 250/636: Performed by: NURSE PRACTITIONER

## 2019-10-08 PROCEDURE — C9113 INJ PANTOPRAZOLE SODIUM, VIA: HCPCS | Performed by: HOSPITALIST

## 2019-10-08 PROCEDURE — 74011250636 HC RX REV CODE- 250/636: Performed by: INTERNAL MEDICINE

## 2019-10-08 PROCEDURE — 85025 COMPLETE CBC W/AUTO DIFF WBC: CPT

## 2019-10-08 PROCEDURE — 82962 GLUCOSE BLOOD TEST: CPT

## 2019-10-08 PROCEDURE — 80053 COMPREHEN METABOLIC PANEL: CPT

## 2019-10-08 PROCEDURE — 74011250637 HC RX REV CODE- 250/637: Performed by: HOSPITALIST

## 2019-10-08 PROCEDURE — 36415 COLL VENOUS BLD VENIPUNCTURE: CPT

## 2019-10-08 PROCEDURE — 74011250636 HC RX REV CODE- 250/636: Performed by: HOSPITALIST

## 2019-10-08 RX ORDER — CALCIUM CARBONATE 200(500)MG
200 TABLET,CHEWABLE ORAL
Status: DISCONTINUED | OUTPATIENT
Start: 2019-10-08 | End: 2019-10-11 | Stop reason: HOSPADM

## 2019-10-08 RX ORDER — POTASSIUM CHLORIDE 750 MG/1
40 TABLET, FILM COATED, EXTENDED RELEASE ORAL DAILY
Status: DISCONTINUED | OUTPATIENT
Start: 2019-10-08 | End: 2019-10-09

## 2019-10-08 RX ORDER — SODIUM CHLORIDE AND POTASSIUM CHLORIDE .9; .15 G/100ML; G/100ML
SOLUTION INTRAVENOUS CONTINUOUS
Status: DISCONTINUED | OUTPATIENT
Start: 2019-10-08 | End: 2019-10-09

## 2019-10-08 RX ORDER — FENTANYL CITRATE 50 UG/ML
50 INJECTION, SOLUTION INTRAMUSCULAR; INTRAVENOUS
Status: DISCONTINUED | OUTPATIENT
Start: 2019-10-08 | End: 2019-10-11 | Stop reason: HOSPADM

## 2019-10-08 RX ADMIN — FENTANYL CITRATE 50 MCG: 50 INJECTION INTRAMUSCULAR; INTRAVENOUS at 11:07

## 2019-10-08 RX ADMIN — POTASSIUM CHLORIDE AND SODIUM CHLORIDE: 900; 150 INJECTION, SOLUTION INTRAVENOUS at 12:36

## 2019-10-08 RX ADMIN — FENTANYL CITRATE 50 MCG: 50 INJECTION INTRAMUSCULAR; INTRAVENOUS at 04:27

## 2019-10-08 RX ADMIN — Medication 10 ML: at 21:28

## 2019-10-08 RX ADMIN — CLOPIDOGREL BISULFATE 75 MG: 75 TABLET ORAL at 11:02

## 2019-10-08 RX ADMIN — MAGNESIUM OXIDE TAB 400 MG (241.3 MG ELEMENTAL MG) 400 MG: 400 (241.3 MG) TAB at 11:02

## 2019-10-08 RX ADMIN — HEPARIN SODIUM 5000 UNITS: 5000 INJECTION INTRAVENOUS; SUBCUTANEOUS at 11:02

## 2019-10-08 RX ADMIN — FENTANYL CITRATE 50 MCG: 50 INJECTION INTRAMUSCULAR; INTRAVENOUS at 17:09

## 2019-10-08 RX ADMIN — ASPIRIN 81 MG: 81 TABLET, COATED ORAL at 11:02

## 2019-10-08 RX ADMIN — FENTANYL CITRATE 25 MCG: 50 INJECTION INTRAMUSCULAR; INTRAVENOUS at 01:26

## 2019-10-08 RX ADMIN — HEPARIN SODIUM 5000 UNITS: 5000 INJECTION INTRAVENOUS; SUBCUTANEOUS at 01:28

## 2019-10-08 RX ADMIN — FENTANYL CITRATE 50 MCG: 50 INJECTION INTRAMUSCULAR; INTRAVENOUS at 20:07

## 2019-10-08 RX ADMIN — FENTANYL CITRATE 50 MCG: 50 INJECTION INTRAMUSCULAR; INTRAVENOUS at 14:13

## 2019-10-08 RX ADMIN — SODIUM CHLORIDE 40 MG: 9 INJECTION INTRAMUSCULAR; INTRAVENOUS; SUBCUTANEOUS at 20:07

## 2019-10-08 RX ADMIN — CALCIUM CARBONATE (ANTACID) CHEW TAB 500 MG 200 MG: 500 CHEW TAB at 12:36

## 2019-10-08 RX ADMIN — PANTOPRAZOLE SODIUM 40 MG: 40 TABLET, DELAYED RELEASE ORAL at 11:02

## 2019-10-08 RX ADMIN — LISINOPRIL 40 MG: 20 TABLET ORAL at 11:02

## 2019-10-08 RX ADMIN — METOPROLOL SUCCINATE 25 MG: 25 TABLET, EXTENDED RELEASE ORAL at 11:01

## 2019-10-08 RX ADMIN — HEPARIN SODIUM 5000 UNITS: 5000 INJECTION INTRAVENOUS; SUBCUTANEOUS at 17:09

## 2019-10-08 RX ADMIN — FENTANYL CITRATE 50 MCG: 50 INJECTION INTRAMUSCULAR; INTRAVENOUS at 08:13

## 2019-10-08 RX ADMIN — POTASSIUM CHLORIDE 40 MEQ: 750 TABLET, EXTENDED RELEASE ORAL at 11:01

## 2019-10-08 RX ADMIN — FENTANYL CITRATE 50 MCG: 50 INJECTION INTRAMUSCULAR; INTRAVENOUS at 23:05

## 2019-10-08 RX ADMIN — MAGNESIUM OXIDE TAB 400 MG (241.3 MG ELEMENTAL MG) 400 MG: 400 (241.3 MG) TAB at 17:09

## 2019-10-08 RX ADMIN — CALCIUM CARBONATE (ANTACID) CHEW TAB 500 MG 200 MG: 500 CHEW TAB at 17:09

## 2019-10-08 NOTE — PROGRESS NOTES
Bedside shift change report given to barrett (oncoming nurse) by danielle (offgoing nurse). Report included the following information SBAR, Kardex and MAR.

## 2019-10-08 NOTE — ROUTINE PROCESS
TRANSFER - OUT REPORT:    Verbal report given to [facility] RN(name) on Beth Harrison  being transferred to (unit) for routine progression of care       Report consisted of patients Situation, Background, Assessment and   Recommendations(SBAR). Information from the following report(s) SBAR, ED Summary, Procedure Summary, MAR and Recent Results was reviewed with the receiving nurse. Lines:   Peripheral IV 10/07/19 Left Hand (Active)   Site Assessment Clean, dry, & intact 10/7/2019  1:22 PM   Phlebitis Assessment 0 10/7/2019  1:22 PM   Infiltration Assessment 0 10/7/2019  1:22 PM   Dressing Status Clean, dry, & intact 10/7/2019  1:22 PM   Dressing Type Transparent 10/7/2019  1:22 PM   Hub Color/Line Status Blue 10/7/2019  1:22 PM   Alcohol Cap Used No 10/7/2019  1:22 PM        Opportunity for questions and clarification was provided.

## 2019-10-08 NOTE — PROGRESS NOTES
Patient visited by [facility] Partner Volunteer on Medical Surgical Unit on 10/8/2019. Rev.  Christiano Kern MDiv, [facility], [facility] paging service: [phone])

## 2019-10-08 NOTE — PROGRESS NOTES
Hospitalist Progress Note  Jonny Mcmillan MD  Answering service: [phone] OR [phone] from in house phone      Date of Service:  10/8/2019  NAME:  Candida Leos                                                         :  1955                                               MRN:  [id number]    Admission summary     81-year-old -American female with medical history significant for diabetes mellitus, hypertension and pancreatitis presenting ambulatory to the emergency department with chief complaint of recurrent epigastric, left upper quadrant back pain,  Subjective/interval history    Ms san complains of epigastric abdominal pain. She is tender. She has h/o GERD,took Nexium until a year ago. Assessment and plan  Epigastric pain and tenderness likely GERD. She has long standing history of GERD. She has taken Nexium for years until it was stopped a bout a year ago. She does not remember if she had egd nor does she recollect if she was told of ulcer disease.  -Iv PPI BID. Antiacid. GI consulted. She may need EGD and colonoscopy. Colonic thickening,this could be incidental.Her symptoms are mainly in the epigastrium   -Given weight loss, age. .she needs colonoscopy and biopsy. GI on board.     #Weight loss  See plan as above     #Chronic pancreatitis  Continue creon   Amylase lipase WNL        #HTN  Continue home dose Lisinopril and Metoprolol      Mild hypokalemia: replace. Mild hyponatremia: resolved    DM II: on metformin. Accucheks. Humalog SS          Diet:clears  Code status:full  DVT prophylaxis:scd. SUP :ppi  Disposition:home  Plan of care discussed with:patient         Current facility administered and prior to admit medications reviewed. x         Review of Systems:  A comprehensive review of systems was negative except for that written in the HPI.         PHYSICAL EXAM:  O:  Visit Vitals  /81 (BP 1 Location: Right arm, BP Patient Position: At rest)   Pulse 82 Temp 98.5 °F (36.9 °C)   Resp 18   Ht 5' (1.524 m)   Wt 66 kg (145 lb 8.1 oz)   SpO2 98%   BMI 28.42 kg/m²     GENERAL:  Alert, oriented, cooperative, no apparent distress  HEENT:  Normocephalic, atraumatic, non icteric sclerae, non pallor conjuctivae, EOMs intact, PERRLA. NECK: Supple, trachea midline, no adenopathy, no thyromegally or tenderness, no carotid bruit and no JVD. LUNGS:   Vesicular breath sounds bilaterally, no added sounds. HEART:   S1 and S2 well heard,RRR,  no murmur, click, rub or gallop. ABDOMEB:   Epigastric tenderness without rebound or guarding. Soft, non-tender. Normoactive bowel sounds. No masses,  No organomegaly. EXTREMETIES:  Atraumatic, acyanotic, no edema  PULSES: 2+ and symmetric all extremities. SKIN:  No rashes or lesions  NEUROLOGY: Alert and oriented to PPT, CNII-XII intact. Motor and sensory exam grossly intact.        Recent labs & imaging reviewed:    Problem List as of 10/8/2019 Never Reviewed          Codes Class Noted - Resolved    * (Principal) Abdominal pain ICD-10-CM: R10.9  ICD-9-CM: 789.00  10/8/2019 - Present        Colon wall thickening ICD-10-CM: K63.9  ICD-9-CM: 569.89  10/7/2019 - Present                Jason Chu MD  Internal Medicine  Date of Service: 10/8/2019

## 2019-10-08 NOTE — ROUTINE PROCESS
Bedside shift change report given to elsa figueroa rn (oncoming nurse) by barrett rn (offgoing nurse). Report included the following information SBAR and Kardex.

## 2019-10-08 NOTE — ROUTINE PROCESS
TRANSFER - IN REPORT:    Verbal report received from yared rn(name) on Sarah Gracia  being received from ed(unit) for routine progression of care      Report consisted of patients Situation, Background, Assessment and   Recommendations(SBAR). Information from the following report(s) SBAR and Kardex was reviewed with the receiving nurse. Opportunity for questions and clarification was provided. Assessment completed upon patients arrival to unit and care assumed.

## 2019-10-08 NOTE — CONSULTS
Ivanna [address]  217 [facility] 140 [facility], [address]  [phone]                     GI CONSULTATION NOTE      NAME:  Shaka Rowe   :   1955   MRN:   [id number]       Referring Physician: Dr Lisa Masters Date: 10/8/2019     Chief Complaint: LUQ pain     History of Present Illness:  Patient is a 59 y.o. who is seen in consultation at the request of Dr. Star Justice for LUQ pain. Ms Maria Esther Thompson is 71-year-old -American female with medical history significant for diabetes mellitus, hypertension and pancreatitis presenting ambulatory to the emergency department with chief complaint of recurrent epigastric, left upper quadrant back pain, described as constant, described as similar to prior episode of pancreatitis with radiation to left flank. There is associated nausea and vomiting. She was prescribed tramadol but on her last admission for pancreatitis from [facility], which has provided minimal resolution of pain. She denies any EtOH intake. Denies any associated fever, chills, headache, dizziness, chest pain, constipation, dysuria, urinary frequency or urgency. She has noticed some diarrhea. She also reports feeling short of breath intermittently. States pain has been progressively worsening. CT scan showed focal circumferential thickening of distal transverse colon. She has been diagnosed with minimal change chronic pancreatitis in the past. Imaging has not been very forthcoming of the changes of pancreatitis. PMH:  Past Medical History:   Diagnosis Date    Diabetes ([address]     Hypertension     Pancreatitis        PSH:  Past Surgical History:   Procedure Laterality Date    HX CHOLECYSTECTOMY      HX HERNIA REPAIR      HX HYSTERECTOMY      HX ORTHOPAEDIC      right shoulder    HX ORTHOPAEDIC      left shoulder    HX TUBAL LIGATION      NEUROLOGICAL PROCEDURE UNLISTED      cervical fusion       Allergies:   Allergies   Allergen Reactions    Latex Hives    Morphine Hives       Home Medications:  Prior to Admission Medications   Prescriptions Last Dose Informant Patient Reported? Taking? albuterol (PROVENTIL HFA, VENTOLIN HFA) 90 mcg/actuation inhaler Not Taking at Unknown time  No No   Sig: Take 2 puffs by inhalation every four (4) hours as needed for Wheezing. aspirin delayed-release 81 mg tablet 10/7/2019 at Unknown time  Yes Yes   Sig: Take 81 mg by mouth daily. clopidogrel (PLAVIX) 75 mg tab 10/7/2019 at Unknown time  Yes Yes   Sig: Take 75 mg by mouth daily. lipase-protease-amylase (CREON) 36,000-114,000- 180,000 unit cpDR capsule   Yes Yes   Sig: Take 2 Caps by mouth three (3) times daily (with meals). lipase-protease-amylase (CREON) 36,000-114,000- 180,000 unit cpDR capsule   Yes Yes   Sig: Take 1 Cap by mouth as needed (snacks). lisinopril (PRINIVIL, ZESTRIL) 40 mg tablet 10/7/2019 at Unknown time  Yes Yes   Sig: Take 40 mg by mouth daily. magnesium oxide (MAG-OX) 400 mg tablet 10/7/2019 at Unknown time  Yes Yes   Sig: Take 400 mg by mouth two (2) times a day. metFORMIN (GLUCOPHAGE) 500 mg tablet Not Taking at Unknown time  Yes No   Sig: Take 500 mg by mouth two (2) times daily (with meals). metoprolol succinate (TOPROL-XL) 25 mg XL tablet 10/7/2019 at Unknown time  Yes Yes   Sig: Take 25 mg by mouth daily. ondansetron (ZOFRAN ODT) 4 mg disintegrating tablet   No No   Sig: Take 1 Tab by mouth every eight (8) hours as needed for Nausea. pantoprazole (PROTONIX) 40 mg tablet 10/7/2019 at Unknown time  Yes Yes   Sig: Take 40 mg by mouth daily. traMADol (ULTRAM) 50 mg tablet   Yes No   Sig: Take 50 mg by mouth every six (6) hours as needed for Pain.       Facility-Administered Medications: None       Hospital Medications:  Current Facility-Administered Medications   Medication Dose Route Frequency    fentaNYL citrate (PF) injection 50 mcg  50 mcg IntraVENous Q3H PRN    sodium chloride (NS) flush 5-40 mL  5-40 mL IntraVENous Q8H    sodium chloride (NS) flush 5-40 mL  5-40 mL IntraVENous PRN    heparin (porcine) injection 5,000 Units  5,000 Units SubCUTAneous Q8H    albuterol (PROVENTIL HFA, VENTOLIN HFA, PROAIR HFA) inhaler 2 Puff  2 Puff Inhalation Q4H PRN    aspirin delayed-release tablet 81 mg  81 mg Oral DAILY    clopidogrel (PLAVIX) tablet 75 mg  75 mg Oral DAILY    . PHARMACY TO SUBSTITUTE PER PROTOCOL (Reordered from: lipase-protease-amylase (CREON) 36,000-114,000- 180,000 unit cpDR capsule)    Per Protocol    lisinopril (PRINIVIL, ZESTRIL) tablet 40 mg  40 mg Oral DAILY    magnesium oxide (MAG-OX) tablet 400 mg  400 mg Oral BID    metoprolol succinate (TOPROL-XL) XL tablet 25 mg  25 mg Oral DAILY    ondansetron (ZOFRAN ODT) tablet 4 mg  4 mg Oral Q8H PRN    pantoprazole (PROTONIX) tablet 40 mg  40 mg Oral DAILY    glucose chewable tablet 16 g  4 Tab Oral PRN    glucagon (GLUCAGEN) injection 1 mg  1 mg IntraMUSCular PRN    dextrose 10% infusion 0-250 mL  0-250 mL IntraVENous PRN    insulin lispro (HUMALOG) injection   SubCUTAneous TIDAC    [Held by provider] traMADol (ULTRAM) tablet 50 mg  50 mg Oral Q6H PRN    0.9% sodium chloride infusion  75 mL/hr IntraVENous CONTINUOUS       Social History:  Social History     Tobacco Use    Smoking status: Former Smoker     Packs/day: 0.50    Smokeless tobacco: Never Used   Substance Use Topics    Alcohol use: No       Family History:  History reviewed. No pertinent family history.     Review of Systems:    Constitutional: negative fever, negative chills, negative weight loss  Eyes:   negative visual changes  ENT:   negative sore throat, tongue or lip swelling  Respiratory:  negative cough, negative dyspnea  Cards:  negative for chest pain, palpitations, lower extremity edema  GI:   See HPI  :  negative for frequency, dysuria  Integument:  negative for rash and pruritus  Heme:  negative for easy bruising and gum/nose bleeding  Musculoskel: negative for myalgias,  back pain and muscle weakness  Neuro: negative for headaches, dizziness, vertigo  Psych:  negative for feelings of anxiety, depression      Objective:   No data found. No intake/output data recorded. No intake/output data recorded. PHYSICAL EXAM:  General: WD, WN. Alert, cooperative, no acute distress    HEENT: NC, Atraumatic. PERRLA, EOMI. Anicteric sclerae. Lungs:  CTA Bilaterally. No Wheezing/Rhonchi/Rales. Heart:  Regular  rhythm,  No murmur (), No Rubs, No Gallops  Abdomen: Soft, Non distended, Non tender.  +Bowel sounds, no HSM  Extremities: No c/c/e  Neurologic:  CN 2-12 gi, Alert and oriented X 3. No acute neurological distress   Psych:   Good insight. Not anxious nor agitated. Data Review     Recent Labs     10/08/19  0132 10/07/19  1324   WBC 4.3 4.9   HGB 12.6 14.2   HCT 40.3 44.8    185     Recent Labs     10/08/19  0132 10/07/19  1324    134*   K 3.3* 4.2    100   CO2 27 28   BUN 8 11   CREA 0.55 0.70   GLU 96 121*   CA 9.2 9.4     Recent Labs     10/08/19  0132 10/07/19  1324   SGOT 79* 112*   * 139*   TP 7.7 8.9*   ALB 3.1* 3.5   GLOB 4.6* 5.4*   AML  --  75   LPSE  --  293     No results for input(s): INR, PTP, APTT, INREXT in the last 72 hours. Imaging studies reviewed          Assessment:   Patient Active Problem List   Diagnosis Code    Colon wall thickening K63.9                      Plan:   Acute flare of minimal change chronic pancreatitis. No necrosis or fluid collection. Pain control. IV fluids and clears by mouth  Transverse colon thickening could represent focal spasm, inflammation vs neoplastic process. WOuld do colonoscopy once acute flare settles down and she is able to take the prep. Will follow closely with you.    Thanks for the kind referral.

## 2019-10-08 NOTE — PROGRESS NOTES
Problem: Falls - Risk of  Goal: *Absence of Falls  Description  Document Trumantimothy Burt Fall Risk and appropriate interventions in the flowsheet.   Outcome: Progressing Towards Goal  Note:   Fall Risk Interventions:            Medication Interventions: Bed/chair exit alarm                   Problem: Pain  Goal: *Control of Pain  Outcome: Progressing Towards Goal

## 2019-10-08 NOTE — PROGRESS NOTES
Transition of Care Plan     Home once medically stable   Transport: family   Reschedule sooner FU appt with PCP once discharge date set (next appt. 1/3/20)       Reason for Admission:   pancreatitis                   RRAT Score:         9, low            Plan for utilizing home health:      No plans to utilize home health at this time. Current Advanced Directive/Advance Care Plan:  Full code, no advanced care plan on file. Discussed with pt but pt declined to make one at this time. Transition of Care Plan:                    Met with pt at bedside, introduced role of CM and verified demographics. Pt lives with mother Bushra Osborne: [phone] and is expected to return home with transportation provided by family. Pt reports no assistance required for ADL's. Pt is ambulatory at baseline and reports using a cane \"every now and then\". Discussed creating an advanced care plan and pt declined to do so at this time. Pt missed appointment with PCP (Dr. Leanne Almazan: [phone]) scheduled for today. CM called PCP to create next possible appointment which is scheduled for 01/03/2020 at 2:40pm. CM informed pt who explained she needed to see PCP earlier than that to refill medication. With no information on when pt will be discharged, CM was not able to request an earlier hospital-stay follow-up visit with PCP. Follow-up appointment will need to be rescheduled once there is more information on discharge timeline. Pt was notified of this plan and informed that [facility] will fill her prescriptions for 30days after discharge.      KENDRICK Ochoa Intern

## 2019-10-09 LAB
ANION GAP SERPL CALC-SCNC: 5 MMOL/L (ref 5–15)
BUN SERPL-MCNC: 6 MG/DL (ref 6–20)
BUN/CREAT SERPL: 11 (ref 12–20)
CALCIUM SERPL-MCNC: 9 MG/DL (ref 8.5–10.1)
CHLORIDE SERPL-SCNC: 107 MMOL/L (ref 97–108)
CK MB CFR SERPL CALC: NORMAL % (ref 0–2.5)
CK MB SERPL-MCNC: <1 NG/ML (ref 5–25)
CK SERPL-CCNC: 54 U/L (ref 26–192)
CO2 SERPL-SCNC: 28 MMOL/L (ref 21–32)
CREAT SERPL-MCNC: 0.54 MG/DL (ref 0.55–1.02)
GLUCOSE BLD STRIP.AUTO-MCNC: 107 MG/DL (ref 65–100)
GLUCOSE BLD STRIP.AUTO-MCNC: 107 MG/DL (ref 65–100)
GLUCOSE BLD STRIP.AUTO-MCNC: 139 MG/DL (ref 65–100)
GLUCOSE BLD STRIP.AUTO-MCNC: 160 MG/DL (ref 65–100)
GLUCOSE SERPL-MCNC: 79 MG/DL (ref 65–100)
LIPASE SERPL-CCNC: 368 U/L (ref 73–393)
POTASSIUM SERPL-SCNC: 4 MMOL/L (ref 3.5–5.1)
SERVICE CMNT-IMP: ABNORMAL
SODIUM SERPL-SCNC: 140 MMOL/L (ref 136–145)
TROPONIN I SERPL-MCNC: <0.05 NG/ML

## 2019-10-09 PROCEDURE — 74011250637 HC RX REV CODE- 250/637: Performed by: INTERNAL MEDICINE

## 2019-10-09 PROCEDURE — 36415 COLL VENOUS BLD VENIPUNCTURE: CPT

## 2019-10-09 PROCEDURE — 82962 GLUCOSE BLOOD TEST: CPT

## 2019-10-09 PROCEDURE — 74011636637 HC RX REV CODE- 636/637: Performed by: INTERNAL MEDICINE

## 2019-10-09 PROCEDURE — 83690 ASSAY OF LIPASE: CPT

## 2019-10-09 PROCEDURE — C9113 INJ PANTOPRAZOLE SODIUM, VIA: HCPCS | Performed by: HOSPITALIST

## 2019-10-09 PROCEDURE — 74011250636 HC RX REV CODE- 250/636: Performed by: HOSPITALIST

## 2019-10-09 PROCEDURE — 82550 ASSAY OF CK (CPK): CPT

## 2019-10-09 PROCEDURE — 74011000250 HC RX REV CODE- 250: Performed by: HOSPITALIST

## 2019-10-09 PROCEDURE — 74011250637 HC RX REV CODE- 250/637: Performed by: HOSPITALIST

## 2019-10-09 PROCEDURE — 84484 ASSAY OF TROPONIN QUANT: CPT

## 2019-10-09 PROCEDURE — 80048 BASIC METABOLIC PNL TOTAL CA: CPT

## 2019-10-09 PROCEDURE — 74011250636 HC RX REV CODE- 250/636: Performed by: INTERNAL MEDICINE

## 2019-10-09 PROCEDURE — 74011250636 HC RX REV CODE- 250/636: Performed by: NURSE PRACTITIONER

## 2019-10-09 PROCEDURE — 65270000032 HC RM SEMIPRIVATE

## 2019-10-09 RX ORDER — SODIUM CHLORIDE AND POTASSIUM CHLORIDE .9; .15 G/100ML; G/100ML
SOLUTION INTRAVENOUS CONTINUOUS
Status: DISCONTINUED | OUTPATIENT
Start: 2019-10-09 | End: 2019-10-11

## 2019-10-09 RX ADMIN — CALCIUM CARBONATE (ANTACID) CHEW TAB 500 MG 200 MG: 500 CHEW TAB at 12:11

## 2019-10-09 RX ADMIN — CALCIUM CARBONATE (ANTACID) CHEW TAB 500 MG 200 MG: 500 CHEW TAB at 06:52

## 2019-10-09 RX ADMIN — CLOPIDOGREL BISULFATE 75 MG: 75 TABLET ORAL at 09:45

## 2019-10-09 RX ADMIN — ASPIRIN 81 MG: 81 TABLET, COATED ORAL at 09:45

## 2019-10-09 RX ADMIN — INSULIN LISPRO 2 UNITS: 100 INJECTION, SOLUTION INTRAVENOUS; SUBCUTANEOUS at 12:00

## 2019-10-09 RX ADMIN — SODIUM CHLORIDE 40 MG: 9 INJECTION INTRAMUSCULAR; INTRAVENOUS; SUBCUTANEOUS at 21:27

## 2019-10-09 RX ADMIN — FENTANYL CITRATE 50 MCG: 50 INJECTION INTRAMUSCULAR; INTRAVENOUS at 02:09

## 2019-10-09 RX ADMIN — FENTANYL CITRATE 50 MCG: 50 INJECTION INTRAMUSCULAR; INTRAVENOUS at 18:28

## 2019-10-09 RX ADMIN — HEPARIN SODIUM 5000 UNITS: 5000 INJECTION INTRAVENOUS; SUBCUTANEOUS at 18:28

## 2019-10-09 RX ADMIN — FENTANYL CITRATE 50 MCG: 50 INJECTION INTRAMUSCULAR; INTRAVENOUS at 12:00

## 2019-10-09 RX ADMIN — CALCIUM CARBONATE (ANTACID) CHEW TAB 500 MG 200 MG: 500 CHEW TAB at 18:30

## 2019-10-09 RX ADMIN — HEPARIN SODIUM 5000 UNITS: 5000 INJECTION INTRAVENOUS; SUBCUTANEOUS at 01:35

## 2019-10-09 RX ADMIN — Medication 10 ML: at 15:03

## 2019-10-09 RX ADMIN — MAGNESIUM OXIDE TAB 400 MG (241.3 MG ELEMENTAL MG) 400 MG: 400 (241.3 MG) TAB at 18:31

## 2019-10-09 RX ADMIN — LISINOPRIL 40 MG: 20 TABLET ORAL at 09:45

## 2019-10-09 RX ADMIN — FENTANYL CITRATE 50 MCG: 50 INJECTION INTRAMUSCULAR; INTRAVENOUS at 06:46

## 2019-10-09 RX ADMIN — Medication 10 ML: at 21:27

## 2019-10-09 RX ADMIN — MAGNESIUM OXIDE TAB 400 MG (241.3 MG ELEMENTAL MG) 400 MG: 400 (241.3 MG) TAB at 09:45

## 2019-10-09 RX ADMIN — FENTANYL CITRATE 50 MCG: 50 INJECTION INTRAMUSCULAR; INTRAVENOUS at 09:45

## 2019-10-09 RX ADMIN — FENTANYL CITRATE 50 MCG: 50 INJECTION INTRAMUSCULAR; INTRAVENOUS at 21:27

## 2019-10-09 RX ADMIN — SODIUM CHLORIDE 40 MG: 9 INJECTION INTRAMUSCULAR; INTRAVENOUS; SUBCUTANEOUS at 09:49

## 2019-10-09 RX ADMIN — POTASSIUM CHLORIDE AND SODIUM CHLORIDE: 900; 150 INJECTION, SOLUTION INTRAVENOUS at 21:27

## 2019-10-09 RX ADMIN — METOPROLOL SUCCINATE 25 MG: 25 TABLET, EXTENDED RELEASE ORAL at 09:45

## 2019-10-09 RX ADMIN — POTASSIUM CHLORIDE AND SODIUM CHLORIDE: 900; 150 INJECTION, SOLUTION INTRAVENOUS at 06:46

## 2019-10-09 RX ADMIN — Medication 10 ML: at 06:49

## 2019-10-09 NOTE — PROGRESS NOTES
Hospitalist Progress Note  Kingsley Wray MD  Answering service: [phone] OR [phone] from in house phone      Date of Service:  10/9/2019  NAME:  Ness Garcia                                                         :  1955                                               MRN:  [id number]    Admission summary     36-year-old -American female with medical history significant for diabetes mellitus, hypertension and pancreatitis presenting ambulatory to the emergency department with chief complaint of recurrent epigastric, left upper quadrant back pain,  Subjective/interval history    Epigastric pain better. Now we know she had MI and stents put in at [facility] in 2019. We could not stop the aspirin and Plavix which she has to be on for 6-12 months. She is not bleeding or anything,she can have EGD and colonoscopy as out patient at a later date or sooner if serious complications arise. Assessment and plan  Epigastric pain and tenderness likely GERD. She has long standing history of GERD. She has taken Nexium for years until it was stopped a bout a year ago. She does not remember if she had egd nor does she recollect if she was told of ulcer disease.  -Iv PPI BID. Antiacid. GI consulted. -She is unable to have EGD and colonoscopy as she is on aspirin and plavix after stents in 2019.  -She needs out patient follow up. Colonic thickening,this could be incidental.Her symptoms are mainly in the epigastrium   -Given weight loss, age. .she needs colonoscopy and biopsy. GI on board. CAD ,recent MI in 2019,s/p stents placed at [facility] [place]  -On aspirin,plavix ,lisinopril, Toprol.     #Weight loss  See plan as above     #Chronic pancreatitis  Continue creon   Amylase lipase WNL        #HTN  Continue home dose Lisinopril and Metoprolol      Mild hypokalemia: resolved. Mild hyponatremia: resolved    DM II: on metformin. Accucheks. Humalog SS          Diet:GI lite diet   Code status:full  DVT prophylaxis:scd. SUP :ppi  Disposition:home later today if tolerates diet. Plan of care discussed with:patient         Current facility administered and prior to admit medications reviewed. x         Review of Systems:  A comprehensive review of systems was negative except for that written in the HPI. PHYSICAL EXAM:  O:  Visit Vitals  /84 (BP 1 Location: Right arm, BP Patient Position: At rest)   Pulse 67   Temp 98.3 °F (36.8 °C)   Resp 16   Ht 5' (1.524 m)   Wt 62.4 kg (137 lb 8 oz)   SpO2 95%   BMI 26.85 kg/m²     GENERAL:  Alert, oriented, cooperative, no apparent distress  HEENT:  Normocephalic, atraumatic, non icteric sclerae, non pallor conjuctivae, EOMs intact, PERRLA. NECK: Supple, trachea midline, no adenopathy, no thyromegally or tenderness, no carotid bruit and no JVD. LUNGS:   Vesicular breath sounds bilaterally, no added sounds. HEART:   S1 and S2 well heard,RRR,  no murmur, click, rub or gallop. ABDOMEB:   Epigastric tenderness improved. Soft, non-tender. Normoactive bowel sounds. No masses,  No organomegaly. EXTREMETIES:  Atraumatic, acyanotic, no edema  PULSES: 2+ and symmetric all extremities. SKIN:  No rashes or lesions  NEUROLOGY: Alert and oriented to PPT, CNII-XII intact. Motor and sensory exam grossly intact.        Recent labs & imaging reviewed:    Problem List as of 10/9/2019 Never Reviewed          Codes Class Noted - Resolved    * (Principal) Abdominal pain ICD-10-CM: R10.9  ICD-9-CM: 789.00  10/8/2019 - Present        Colon wall thickening ICD-10-CM: K63.9  ICD-9-CM: 569.89  10/7/2019 - Present                Avinash Faulkner MD  Internal Medicine  Date of Service: 10/9/2019

## 2019-10-09 NOTE — PROGRESS NOTES
118 [facility].  217 [facility] 140 [facility], [address]  [phone]                GI PROGRESS NOTE      NAME:   Millie Kovacs   :    1955   MRN:    [id number]     Assessment/Plan   -LUQ pain-suspect flare of pancreatitis. Normal amylase and lipase is likely from chronic pancreatitis. Would like to rule out peptic ulcer though. -Colonic thickening- rule out underlying malignancy. Recommend colonoscopy tomorrow. Clear liquids today and advance as tolerated. She is on Plavix. Need to hold it for 5 days prior to EGD & Colonoscopy        Patient Active Problem List   Diagnosis Code    Colon wall thickening K63.9    Abdominal pain R10.9       Subjective: Millie Kovacs is a 59 y.o.  female who states pain is little better but not completely gone. She is able to take clears. No BM since admission     Review of Systems    Constitutional: negative fever, negative chills, negative weight loss  Eyes:   negative visual changes  ENT:   negative sore throat, tongue or lip swelling  Respiratory:  negative cough, negative dyspnea  Cards:  negative for chest pain, palpitations, lower extremity edema  GI:   See HPI  :  negative for frequency, dysuria  Integument:  negative for rash and pruritus  Heme:  negative for easy bruising and gum/nose bleeding  Musculoskel: negative for myalgias,  back pain and muscle weakness  Neuro: negative for headaches, dizziness, vertigo  Psych:  negative for feelings of anxiety, depression           Objective:     VITALS:   Last 24hrs VS reviewed since prior hospitalist progress note.  Most recent are:  Visit Vitals  /86 (BP 1 Location: Right arm)   Pulse 65   Temp 98.1 °F (36.7 °C)   Resp 16   Ht 5' (1.524 m)   Wt 62.4 kg (137 lb 8 oz)   SpO2 97%   BMI 26.85 kg/m²     No intake or output data in the 24 hours ending 10/09/19 0816     PHYSICAL EXAM:  General   well developed, well nourished, appears stated age, in no acute distress  EENT  Normocephalic, Atraumatic, PERRLA, EOMI, sclera clear, nares clear, pharynx normal  Neck   Supple without nodes or mass. No thyromegaly or bruit  Respiratory   Clear To Auscultation bilaterally - no wheezes, rales, rhonchi, or crackles  Cardiology  Regular Rate and Rythmn  - no murmurs, rubs or gallops  Abdominal  Soft, non-tender, non-distended, positive bowel sounds, no hepatosplenomegaly, no palpable mass  Extremities  No clubbing, cyanosis, or edema. Pulses intact. Back  No spinal or muscle pain. No CVAT. Skin  Normal skin turgor. No rashes or skin ulcers noted  Neurological  No focal neurological deficits noted  Psychological  Oriented x 3. Normal affect.        Lab Data   Recent Results (from the past 12 hour(s))   GLUCOSE, POC    Collection Time: 10/08/19  9:33 PM   Result Value Ref Range    Glucose (POC) 151 (H) 65 - 100 mg/dL    Performed by [facility] [employer], BASIC    Collection Time: 10/09/19  1:40 AM   Result Value Ref Range    Sodium 140 136 - 145 mmol/L    Potassium 4.0 3.5 - 5.1 mmol/L    Chloride 107 97 - 108 mmol/L    CO2 28 21 - 32 mmol/L    Anion gap 5 5 - 15 mmol/L    Glucose 79 65 - 100 mg/dL    BUN 6 6 - 20 MG/DL    Creatinine 0.54 (L) 0.55 - 1.02 MG/DL    BUN/Creatinine ratio 11 (L) 12 - 20      GFR est AA >60 >60 ml/min/1.73m2    GFR est non-AA >60 >60 ml/min/1.73m2    Calcium 9.0 8.5 - 10.1 MG/DL   GLUCOSE, POC    Collection Time: 10/09/19  6:52 AM   Result Value Ref Range    Glucose (POC) 107 (H) 65 - 100 mg/dL    Performed by Neldon Cage          Medications: Reviewed    PMH/ reviewed - no change compared to H&P  Attending Physician: Manpreet Sharma MD   Date/Time:  10/9/2019

## 2019-10-09 NOTE — PROGRESS NOTES
Reassessed after lunch. Severe abd pain after lunch,GI lite diet. Lipase increased although still wnl. She has had chronic pancreatitis. Switch to full liquid diet and hold discharge.     Gabriel Irizarry MD

## 2019-10-10 LAB
GLUCOSE BLD STRIP.AUTO-MCNC: 119 MG/DL (ref 65–100)
GLUCOSE BLD STRIP.AUTO-MCNC: 130 MG/DL (ref 65–100)
GLUCOSE BLD STRIP.AUTO-MCNC: 134 MG/DL (ref 65–100)
GLUCOSE BLD STRIP.AUTO-MCNC: 160 MG/DL (ref 65–100)
LIPASE SERPL-CCNC: 218 U/L (ref 73–393)
SERVICE CMNT-IMP: ABNORMAL

## 2019-10-10 PROCEDURE — 36415 COLL VENOUS BLD VENIPUNCTURE: CPT

## 2019-10-10 PROCEDURE — C9113 INJ PANTOPRAZOLE SODIUM, VIA: HCPCS | Performed by: HOSPITALIST

## 2019-10-10 PROCEDURE — 74011000250 HC RX REV CODE- 250: Performed by: HOSPITALIST

## 2019-10-10 PROCEDURE — 74011250636 HC RX REV CODE- 250/636: Performed by: NURSE PRACTITIONER

## 2019-10-10 PROCEDURE — 74011250636 HC RX REV CODE- 250/636: Performed by: HOSPITALIST

## 2019-10-10 PROCEDURE — 74011250637 HC RX REV CODE- 250/637: Performed by: HOSPITALIST

## 2019-10-10 PROCEDURE — 74011250636 HC RX REV CODE- 250/636: Performed by: INTERNAL MEDICINE

## 2019-10-10 PROCEDURE — 83690 ASSAY OF LIPASE: CPT

## 2019-10-10 PROCEDURE — 74011250637 HC RX REV CODE- 250/637: Performed by: INTERNAL MEDICINE

## 2019-10-10 PROCEDURE — 65270000032 HC RM SEMIPRIVATE

## 2019-10-10 PROCEDURE — 82962 GLUCOSE BLOOD TEST: CPT

## 2019-10-10 RX ORDER — ADHESIVE BANDAGE
30 BANDAGE TOPICAL DAILY
Status: DISCONTINUED | OUTPATIENT
Start: 2019-10-10 | End: 2019-10-11 | Stop reason: HOSPADM

## 2019-10-10 RX ORDER — AMOXICILLIN 250 MG
2 CAPSULE ORAL DAILY
Status: DISCONTINUED | OUTPATIENT
Start: 2019-10-10 | End: 2019-10-11 | Stop reason: HOSPADM

## 2019-10-10 RX ADMIN — MAGNESIUM OXIDE TAB 400 MG (241.3 MG ELEMENTAL MG) 400 MG: 400 (241.3 MG) TAB at 08:09

## 2019-10-10 RX ADMIN — FENTANYL CITRATE 50 MCG: 50 INJECTION INTRAMUSCULAR; INTRAVENOUS at 23:21

## 2019-10-10 RX ADMIN — PANCRELIPASE 2 CAPSULE: 60000; 12000; 38000 CAPSULE, DELAYED RELEASE PELLETS ORAL at 12:14

## 2019-10-10 RX ADMIN — FENTANYL CITRATE 50 MCG: 50 INJECTION INTRAMUSCULAR; INTRAVENOUS at 08:10

## 2019-10-10 RX ADMIN — MAGNESIUM OXIDE TAB 400 MG (241.3 MG ELEMENTAL MG) 400 MG: 400 (241.3 MG) TAB at 17:01

## 2019-10-10 RX ADMIN — POTASSIUM CHLORIDE AND SODIUM CHLORIDE: 900; 150 INJECTION, SOLUTION INTRAVENOUS at 12:36

## 2019-10-10 RX ADMIN — MAGNESIUM HYDROXIDE 30 ML: 400 SUSPENSION ORAL at 15:30

## 2019-10-10 RX ADMIN — Medication 10 ML: at 22:35

## 2019-10-10 RX ADMIN — CALCIUM CARBONATE (ANTACID) CHEW TAB 500 MG 200 MG: 500 CHEW TAB at 12:14

## 2019-10-10 RX ADMIN — PANCRELIPASE 2 CAPSULE: 24000; 76000; 120000 CAPSULE, DELAYED RELEASE PELLETS ORAL at 17:14

## 2019-10-10 RX ADMIN — Medication 10 ML: at 13:55

## 2019-10-10 RX ADMIN — METOPROLOL SUCCINATE 25 MG: 25 TABLET, EXTENDED RELEASE ORAL at 08:10

## 2019-10-10 RX ADMIN — FENTANYL CITRATE 50 MCG: 50 INJECTION INTRAMUSCULAR; INTRAVENOUS at 20:12

## 2019-10-10 RX ADMIN — Medication 10 ML: at 04:54

## 2019-10-10 RX ADMIN — LISINOPRIL 40 MG: 20 TABLET ORAL at 08:10

## 2019-10-10 RX ADMIN — SODIUM CHLORIDE 40 MG: 9 INJECTION INTRAMUSCULAR; INTRAVENOUS; SUBCUTANEOUS at 22:34

## 2019-10-10 RX ADMIN — FENTANYL CITRATE 50 MCG: 50 INJECTION INTRAMUSCULAR; INTRAVENOUS at 04:50

## 2019-10-10 RX ADMIN — FENTANYL CITRATE 50 MCG: 50 INJECTION INTRAMUSCULAR; INTRAVENOUS at 13:55

## 2019-10-10 RX ADMIN — SENNOSIDES, DOCUSATE SODIUM 2 TABLET: 50; 8.6 TABLET, FILM COATED ORAL at 15:30

## 2019-10-10 RX ADMIN — PANCRELIPASE 2 CAPSULE: 24000; 76000; 120000 CAPSULE, DELAYED RELEASE PELLETS ORAL at 12:14

## 2019-10-10 RX ADMIN — CALCIUM CARBONATE (ANTACID) CHEW TAB 500 MG 200 MG: 500 CHEW TAB at 07:02

## 2019-10-10 RX ADMIN — SODIUM CHLORIDE 40 MG: 9 INJECTION INTRAMUSCULAR; INTRAVENOUS; SUBCUTANEOUS at 08:10

## 2019-10-10 RX ADMIN — FENTANYL CITRATE 50 MCG: 50 INJECTION INTRAMUSCULAR; INTRAVENOUS at 03:00

## 2019-10-10 RX ADMIN — CLOPIDOGREL BISULFATE 75 MG: 75 TABLET ORAL at 08:09

## 2019-10-10 RX ADMIN — CALCIUM CARBONATE (ANTACID) CHEW TAB 500 MG 200 MG: 500 CHEW TAB at 17:01

## 2019-10-10 RX ADMIN — ASPIRIN 81 MG: 81 TABLET, COATED ORAL at 08:09

## 2019-10-10 RX ADMIN — FENTANYL CITRATE 50 MCG: 50 INJECTION INTRAMUSCULAR; INTRAVENOUS at 00:27

## 2019-10-10 RX ADMIN — HEPARIN SODIUM 5000 UNITS: 5000 INJECTION INTRAVENOUS; SUBCUTANEOUS at 17:03

## 2019-10-10 RX ADMIN — PANCRELIPASE 2 CAPSULE: 60000; 12000; 38000 CAPSULE, DELAYED RELEASE PELLETS ORAL at 17:08

## 2019-10-10 RX ADMIN — FENTANYL CITRATE 50 MCG: 50 INJECTION INTRAMUSCULAR; INTRAVENOUS at 11:03

## 2019-10-10 NOTE — PROGRESS NOTES
Bedside and Verbal shift change report given to [address] [place] [address],  [address], RN (oncoming nurse) by Carlotta Cruz RN (offgoing nurse). Report included the following information SBAR, Kardex, Intake/Output, MAR and Recent Results.

## 2019-10-10 NOTE — PROGRESS NOTES
Bedside shift change report given to lalo (oncoming nurse) by Yusuf Lawson (offgoing nurse). Report included the following information SBAR and Kardex.

## 2019-10-10 NOTE — PROGRESS NOTES
Transition of Care Plan     · Home once medically stable  · Transport: family  · Reschedule sooner FU appt with PCP once discharge date set (next appt.  1/3/20)   118 [facility]     [phone]

## 2019-10-10 NOTE — PROGRESS NOTES
Problem: Falls - Risk of  Goal: *Absence of Falls  Description  Document Herman Chapa Fall Risk and appropriate interventions in the flowsheet.   Outcome: Progressing Towards Goal  Note:   Fall Risk Interventions:  Mobility Interventions: Communicate number of staff needed for ambulation/transfer, Patient to call before getting OOB         Medication Interventions: Patient to call before getting OOB, Teach patient to arise slowly                   Problem: Pain  Goal: *Control of Pain  Outcome: Not Progressing Towards Goal

## 2019-10-10 NOTE — PROGRESS NOTES
Hospitalist Progress Note  Katia Piper MD  Answering service: [phone] OR [phone] from in house phone      Date of Service:  10/10/2019  NAME:  Lele King                                                         :  1955                                               MRN:  [id number]    Admission summary     23-year-old -American female with medical history significant for diabetes mellitus, hypertension and pancreatitis presenting ambulatory to the emergency department with chief complaint of recurrent epigastric, left upper quadrant back pain,  Subjective/interval history    Eating exacerbated her abdominal/epigastric pain. She could not tolerate GI lite diet,so was placed back on full liquid diet. She did not have BM since . Assessment and plan  Epigastric pain and tenderness likely GERD vs flare up of pancreatitis. She has long standing history of GERD. She has taken Nexium for years until it was stopped a bout a year ago. She does not remember if she had egd nor does she recollect if she was told of ulcer disease.  -Iv PPI BID. Antiacid. GI consulted. -She is unable to have EGD and colonoscopy as she is on aspirin and plavix after stents in 2019.  -She needs out patient follow up. -Advance diet     Colonic thickening,this could be incidental.Her symptoms are mainly in the epigastrium   -Given weight loss, age. .she needs colonoscopy and biopsy. GI on board. CAD ,[place] 2019[place] [facility] [place]  -On aspirin,plavix ,lisinopril, Toprol.     #Weight loss  See plan as above     #Chronic pancreatitis  Continue creon   Amylase lipase WNL        #HTN  Continue home dose Lisinopril and Metoprolol     #Mild hypokalemia: resolved. #Mild hyponatremia: resolved    #DM II: on metformin. Accucheks. Humalog SS    #Constipation:Milk of magnesia,pericolace. Diet:GI lite diet   Code status:full  DVT prophylaxis:scd.  SUP :ppi  Disposition:home later today if tolerates diet. Plan of care discussed with:patient         Current facility administered and prior to admit medications reviewed. x         Review of Systems:  A comprehensive review of systems was negative except for that written in the HPI. PHYSICAL EXAM:  O:  Visit Vitals  /87 (BP 1 Location: Right arm, BP Patient Position: At rest)   Pulse 64   Temp 97.7 °F (36.5 °C)   Resp 16   Ht 5' (1.524 m)   Wt 62.1 kg (136 lb 14.5 oz)   SpO2 99%   BMI 26.74 kg/m²     GENERAL:  Alert, oriented, cooperative, no apparent distress  HEENT:  Normocephalic, atraumatic, non icteric sclerae, non pallor conjuctivae, EOMs intact, PERRLA. NECK: Supple, trachea midline, no adenopathy, no thyromegally or tenderness, no carotid bruit and no JVD. LUNGS:   Vesicular breath sounds bilaterally, no added sounds. HEART:   S1 and S2 well heard,RRR,  no murmur, click, rub or gallop. ABDOMEB:   Epigastric tenderness improved. Soft, non-tender. Normoactive bowel sounds. No masses,  No organomegaly. EXTREMETIES:  Atraumatic, acyanotic, no edema  PULSES: 2+ and symmetric all extremities. SKIN:  No rashes or lesions  NEUROLOGY: Alert and oriented to PPT, CNII-XII intact. Motor and sensory exam grossly intact.        Recent labs & imaging reviewed:    Problem List as of 10/10/2019 Never Reviewed          Codes Class Noted - Resolved    * (Principal) Abdominal pain ICD-10-CM: R10.9  ICD-9-CM: 789.00  10/8/2019 - Present        Colon wall thickening ICD-10-CM: K63.9  ICD-9-CM: 569.89  10/7/2019 - Present                Whitney Galvan MD  Internal Medicine  Date of Service: 10/10/2019

## 2019-10-10 NOTE — PROGRESS NOTES
[address]  [address]   [phone]                GI PROGRESS NOTE  Kaci Andrade AGACNP-BC  Work Cell: [phone]      NAME:   Fatou Ramesh   :    1955   MRN:    [id number]     Assessment/Plan   1. Upper abdominal pain - suspect flare of pancreatitis. Normal amylase and lipase likely from chronic pancreatitis, r/o PUD, etc. Symptoms slowly improving.  - Diet as tolerated  - Supportive management per primary team  - Continue PPI  2. Colonic wall thickening - r/o underlying malignancy, diarrhea resolved   3. CAD s/p recent stent placement on Plavix    Needs EGD/colonoscopy once able to temporarily hold Plavix for 5 days prior to procedures. Patient Active Problem List   Diagnosis Code    Colon wall thickening K63.9    Abdominal pain R10.9       Subjective:     Feeling okay. Had worsening abdominal pain with advancement of diet yesterday; therefore she was scaled back to clear liquids. Just ate lunch (sandwich) and so far is feeling okay. No further diarrhea. Objective:     VITALS:   Last 24hrs VS reviewed since prior hospitalist progress note. Most recent are:  Visit Vitals  /75 (BP 1 Location: Right arm, BP Patient Position: At rest)   Pulse 60   Temp 98 °F (36.7 °C)   Resp 16   Ht 5' (1.524 m)   Wt 62.1 kg (136 lb 14.5 oz)   SpO2 100%   BMI 26.74 kg/m²     No intake or output data in the 24 hours ending 10/10/19 1332     PHYSICAL EXAM:  General   well developed, alert, in no acute distress  EENT  Normocephalic, Atraumatic, PERRLA, EOMI, sclera clear  Respiratory   Clear To Auscultation bilaterally - no wheezes, rales, rhonchi, or crackles  Cardiology  Regular Rate and Rythmn  - no murmurs, rubs or gallops  Abdominal  Soft, non-distended, mild epigastric tenderness, positive bowel sounds, no hepatosplenomegaly, no palpable mass  Neurological  No focal neurological deficits noted  Psychological  Oriented x 3. Normal affect. Lab Data   Recent Results (from the past 12 hour(s))   GLUCOSE, POC    Collection Time: 10/10/19  6:26 AM   Result Value Ref Range    Glucose (POC) 134 (H) 65 - 100 mg/dL    Performed by Dmitriy Renteria, POC    Collection Time: 10/10/19 10:58 AM   Result Value Ref Range    Glucose (POC) 119 (H) 65 - 100 mg/dL    Performed by SMART CONY          Medications: Reviewed    PMH/ reviewed - no change compared to H&P  Mid-Level Provider: Jean Billings NP   Date/Time:  10/10/2019

## 2019-10-11 VITALS
BODY MASS INDEX: 26.84 KG/M2 | RESPIRATION RATE: 16 BRPM | TEMPERATURE: 98.2 F | WEIGHT: 136.69 LBS | OXYGEN SATURATION: 100 % | HEART RATE: 61 BPM | DIASTOLIC BLOOD PRESSURE: 91 MMHG | SYSTOLIC BLOOD PRESSURE: 166 MMHG | HEIGHT: 60 IN

## 2019-10-11 LAB
GLUCOSE BLD STRIP.AUTO-MCNC: 93 MG/DL (ref 65–100)
LIPASE SERPL-CCNC: 129 U/L (ref 73–393)
SERVICE CMNT-IMP: NORMAL

## 2019-10-11 PROCEDURE — C9113 INJ PANTOPRAZOLE SODIUM, VIA: HCPCS | Performed by: HOSPITALIST

## 2019-10-11 PROCEDURE — 74011250636 HC RX REV CODE- 250/636: Performed by: NURSE PRACTITIONER

## 2019-10-11 PROCEDURE — 74011250636 HC RX REV CODE- 250/636: Performed by: HOSPITALIST

## 2019-10-11 PROCEDURE — 74011250637 HC RX REV CODE- 250/637: Performed by: INTERNAL MEDICINE

## 2019-10-11 PROCEDURE — 74011250637 HC RX REV CODE- 250/637: Performed by: HOSPITALIST

## 2019-10-11 PROCEDURE — 83690 ASSAY OF LIPASE: CPT

## 2019-10-11 PROCEDURE — 82962 GLUCOSE BLOOD TEST: CPT

## 2019-10-11 PROCEDURE — 36415 COLL VENOUS BLD VENIPUNCTURE: CPT

## 2019-10-11 PROCEDURE — 74011000250 HC RX REV CODE- 250: Performed by: HOSPITALIST

## 2019-10-11 RX ORDER — CALCIUM CARBONATE 200(500)MG
200 TABLET,CHEWABLE ORAL
Qty: 90 TAB | Refills: 0 | Status: SHIPPED | OUTPATIENT
Start: 2019-10-11 | End: 2019-11-10

## 2019-10-11 RX ORDER — PANTOPRAZOLE SODIUM 40 MG/1
40 TABLET, DELAYED RELEASE ORAL 2 TIMES DAILY
Qty: 60 TAB | Refills: 0 | Status: SHIPPED | OUTPATIENT
Start: 2019-10-11 | End: 2019-11-10

## 2019-10-11 RX ADMIN — MAGNESIUM OXIDE TAB 400 MG (241.3 MG ELEMENTAL MG) 400 MG: 400 (241.3 MG) TAB at 09:47

## 2019-10-11 RX ADMIN — SODIUM CHLORIDE 40 MG: 9 INJECTION INTRAMUSCULAR; INTRAVENOUS; SUBCUTANEOUS at 09:47

## 2019-10-11 RX ADMIN — POTASSIUM CHLORIDE AND SODIUM CHLORIDE: 900; 150 INJECTION, SOLUTION INTRAVENOUS at 04:13

## 2019-10-11 RX ADMIN — LISINOPRIL 40 MG: 20 TABLET ORAL at 09:47

## 2019-10-11 RX ADMIN — FENTANYL CITRATE 50 MCG: 50 INJECTION INTRAMUSCULAR; INTRAVENOUS at 09:52

## 2019-10-11 RX ADMIN — FENTANYL CITRATE 50 MCG: 50 INJECTION INTRAMUSCULAR; INTRAVENOUS at 06:51

## 2019-10-11 RX ADMIN — Medication 10 ML: at 05:19

## 2019-10-11 RX ADMIN — SENNOSIDES, DOCUSATE SODIUM 2 TABLET: 50; 8.6 TABLET, FILM COATED ORAL at 09:47

## 2019-10-11 RX ADMIN — CALCIUM CARBONATE (ANTACID) CHEW TAB 500 MG 200 MG: 500 CHEW TAB at 06:52

## 2019-10-11 RX ADMIN — PANCRELIPASE 2 CAPSULE: 60000; 12000; 38000 CAPSULE, DELAYED RELEASE PELLETS ORAL at 09:48

## 2019-10-11 RX ADMIN — METOPROLOL SUCCINATE 25 MG: 25 TABLET, EXTENDED RELEASE ORAL at 09:47

## 2019-10-11 RX ADMIN — PANCRELIPASE 2 CAPSULE: 24000; 76000; 120000 CAPSULE, DELAYED RELEASE PELLETS ORAL at 09:48

## 2019-10-11 RX ADMIN — FENTANYL CITRATE 50 MCG: 50 INJECTION INTRAMUSCULAR; INTRAVENOUS at 03:03

## 2019-10-11 RX ADMIN — CLOPIDOGREL BISULFATE 75 MG: 75 TABLET ORAL at 09:47

## 2019-10-11 RX ADMIN — ASPIRIN 81 MG: 81 TABLET, COATED ORAL at 09:47

## 2019-10-11 NOTE — PROGRESS NOTES
Problem: Falls - Risk of  Goal: *Absence of Falls  Description  Document Yara Zhang Fall Risk and appropriate interventions in the flowsheet.   Outcome: Progressing Towards Goal  Note:   Fall Risk Interventions:  Mobility Interventions: Communicate number of staff needed for ambulation/transfer         Medication Interventions: Evaluate medications/consider consulting pharmacy    Elimination Interventions: Call light in reach

## 2019-10-11 NOTE — PROGRESS NOTES
Bedside and Verbal shift change report given to [address] [place] [address],  [address], RN (oncoming nurse) by Thierry Goode RN (offgoing nurse). Report included the following information SBAR, Kardex, Intake/Output, MAR and Recent Results.

## 2019-10-11 NOTE — DISCHARGE INSTRUCTIONS
Dear Jess Brochure,    Thank you for choosing [address] [place] [address] for your healthcare needs. We strive to provide EXCELLENT care to you and your family. In an effort to explain clearly why you were here in the hospital, I've written a very brief summary below. You were admitted: 1. Epigastric abdominal pain,most probably due to gastroesophageal reflux disease or possibly mild flare of pancreatitis. You symptoms have improved. You have been seen by gastroenterologist.Take the protonix twice daily for one month,then daily. You need follow up with the gastroenterology doctor and you probably need endoscopy and or colonoscopy as out patient. 2.Colonic wall thickening. The CT scan showed thickening of the large intestine. The exact etiology is not clear . It could be inflammatory, malignancy etc.You need colonoscopy and biopsy to make drew this is not cancer. You could not have colonoscopy and biopsy at this time as the aspirin and Plavix has to be held for 5-7 days before biopsy. However,you had cardiac stents recently so the aspirin and plavix could not be stopped at this time. Its generally recommended to continue on the dual antiplatelets,aspirin and Plavix ,for 6-12 months after cardiac stenting. I highly recommend you discuss this with you cardiologist after discharge for advise as to when you can safely interrupt taking the aspirin and plavix for a procedure. Please also closely follow with Gastroenterology doctors. Their contact is listed below. Patient Education        Gastroesophageal Reflux Disease (GERD): Care Instructions  Your Care Instructions    Gastroesophageal reflux disease (GERD) is the backward flow of stomach acid into the esophagus. The esophagus is the tube that leads from your throat to your stomach. A one-way valve prevents the stomach acid from moving up into this tube. When you have GERD, this valve does not close tightly enough.   If you have mild GERD symptoms including heartburn, you may be able to control the problem with antacids or over-the-counter medicine. Changing your diet, losing weight, and making other lifestyle changes can also help reduce symptoms. Follow-up care is a key part of your treatment and safety. Be sure to make and go to all appointments, and call your doctor if you are having problems. It's also a good idea to know your test results and keep a list of the medicines you take. How can you care for yourself at home? · Take your medicines exactly as prescribed. Call your doctor if you think you are having a problem with your medicine. · Your doctor may recommend over-the-counter medicine. For mild or occasional indigestion, antacids, such as Tums, Gaviscon, Mylanta, or Maalox, may help. Your doctor also may recommend over-the-counter acid reducers, such as Pepcid AC, Tagamet HB, Zantac 75, or Prilosec. Read and follow all instructions on the label. If you use these medicines often, talk with your doctor. · Change your eating habits. ? It's best to eat several small meals instead of two or three large meals. ? After you eat, wait 2 to 3 hours before you lie down. ? Chocolate, mint, and alcohol can make GERD worse. ? Spicy foods, foods that have a lot of acid (like tomatoes and oranges), and coffee can make GERD symptoms worse in some people. If your symptoms are worse after you eat a certain food, you may want to stop eating that food to see if your symptoms get better. · Do not smoke or chew tobacco. Smoking can make GERD worse. If you need help quitting, talk to your doctor about stop-smoking programs and medicines. These can increase your chances of quitting for good. · If you have GERD symptoms at night, raise the head of your bed 6 to 8 inches by putting the frame on blocks or placing a foam wedge under the head of your mattress. (Adding extra pillows does not work.)  · Do not wear tight clothing around your middle. · Lose weight if you need to.  Losing just 5 to 10 pounds can help. When should you call for help? Call your doctor now or seek immediate medical care if:    · You have new or different belly pain.     · Your stools are black and tarlike or have streaks of blood.    Watch closely for changes in your health, and be sure to contact your doctor if:    · Your symptoms have not improved after 2 days.     · Food seems to catch in your throat or chest.     Patient Education        Diet for Chronic Pancreatitis: Care Instructions  Your Care Instructions    The pancreas is an organ behind the stomach that makes hormones and enzymes to help your body digest food. Sometimes the enzymes attack another part of the pancreas, which can cause pain and swelling. This is called pancreatitis. Chronic pancreatitis may cause you to be in pain much of the time. You may be able to help the pain by avoiding alcohol and eating a low-fat diet. Your doctor and dietitian can help you make an eating plan that does not irritate your digestive system. Always talk with your doctor or dietitian before you make changes in your diet. Follow-up care is a key part of your treatment and safety. Be sure to make and go to all appointments, and call your doctor if you are having problems. It's also a good idea to know your test results and keep a list of the medicines you take. How can you care for yourself at home? · Do not drink alcohol. It may make your pain worse and cause other problems. Tell your doctor if you need help to quit. Counseling, support groups, and sometimes medicines can help you stay sober. · Ask your doctor if you need to take pancreatic enzyme pills to help your body digest fat and protein. · Drink plenty of fluids, enough so that your urine is light yellow or clear like water. If you have kidney, heart, or liver disease and have to limit fluids, talk with your doctor before you increase the amount of fluids you drink.   Eat a low-fat diet  · Eat many small meals and snacks each day instead of three large meals. · Choose lean meats. ? Eat no more than 5 to 6½ ounces of meat a day. ? Cut off all fat you can see. ? Eat chicken and turkey without the skin. ? Many types of fish, such as salmon, lake trout, tuna, and herring, provide healthy omega-3 fat. But avoid fish canned in oil, such as sardines in olive oil. ? Bake, broil, or grill meats, poultry, or fish instead of frying them in butter or fat. · Drink or eat nonfat or low-fat milk, yogurt, cheese, or other milk products each day. ? Read the labels on cheeses, and choose those with less than 5 grams of fat an ounce. ? Try fat-free sour cream, cream cheese, or yogurt. ? Avoid cream soups and cream sauces on pasta. ? Eat low-fat ice cream, frozen yogurt, or sorbet. Avoid regular ice cream.  · Eat whole-grain cereals, breads, crackers, rice, or pasta. Avoid high-fat foods such as croissants, scones, biscuits, waffles, doughnuts, muffins, granola, and high-fat breads. · Flavor your foods with herbs and spices (such as basil, tarragon, or mint), fat-free sauces, or lemon juice instead of butter. You can also use butter substitutes, fat-free mayonnaise, or fat-free dressing. · Try applesauce, prune puree, or mashed bananas to replace some or all of the fat when you bake. · Limit fats and oils, such as butter, margarine, mayonnaise, and salad dressing, to no more than 1 tablespoon a meal.  · Avoid high-fat foods, such as:  ? Chocolate, whole milk, ice cream, processed cheese, and egg yolks. ? Fried or buttered foods. ? Sausage, salami, and edwards. ? Cinnamon rolls, cakes, pies, cookies, and other pastries. ? Prepared snack foods, such as potato chips, nut and granola bars, and mixed nuts. ? Coconut and avocado. · Learn how to read food labels for serving sizes and ingredients. Fast-food and convenience-food meals often have lots of fat.           You also received care from specialist physicians in the following specialties:GASTROENTEROLOGY        Here are the updates to your medication list    Remember that it is important for you to take your medications exactly as they are prescribed. It is helpful to keep a list of your medication with the names, dosages, and times to be taken in your wallet. Make sure to also see your primary care doctor for follow-up. Bring these papers with you and be sure to review your medication list with your doctor. I cannot stress the importance of follow up enough. I've included the information for your follow-up appointments below: Follow-up Information     Follow up With Specialties Details Why Contact Info    Lara Sharpe MD Internal Medicine On 1/3/2020  [facility] [address] [facility]  [phone]      Valentín Broderick MD Gastroenterology, Gastroenterology   [address] [facility]  [address]  [phone]            At this time, the following test results are still pending: none  Again, please follow-up these results with your primary care provider. Should you have any fever over 101 degrees for 24 hours, chest pain, shortness of breath, fever, chills, nausea, vomiting, diarrhea, change in mentation, falling, weakness, bleeding, or worsening pain, please seek medical attention immediately. If you have any questions, I can be reached at [phone].   Thank you so much again for allowing me to care for you at [facility].    Respectfully yours,  Rosy Us MD

## 2019-10-11 NOTE — PROGRESS NOTES
[address]  [address]   [phone]                GI PROGRESS NOTE  Anna Perry AGACNP-BC  Work Cell: [phone]      NAME:   Erwin Lay   :    1955   MRN:    [id number]     Assessment/Plan   1. Upper abdominal pain - suspect flare of pancreatitis. Normal amylase and lipase likely from chronic pancreatitis, r/o PUD, etc. Symptoms improved. - Diet as tolerated  - Supportive management per primary team  - Continue PPI  2. Colonic wall thickening - r/o underlying malignancy, diarrhea resolved   3. CAD s/p recent stent placement on Plavix    Okay to discharge from GI standpoint, will arrange outpatient follow-up to discuss EGD/colonoscopy once able to temporarily hold Plavix for 5 days prior to procedures. Patient Active Problem List   Diagnosis Code    Colon wall thickening K63.9    Abdominal pain R10.9       Subjective:     Feeling better. Reports minimal pain with eating. Tolerating regular diet. Passing flatus and had BM. Objective:     VITALS:   Last 24hrs VS reviewed since prior hospitalist progress note.  Most recent are:  Visit Vitals  BP (!) 166/91 (BP 1 Location: Right arm, BP Patient Position: At rest)   Pulse 61   Temp 98.2 °F (36.8 °C)   Resp 16   Ht 5' (1.524 m)   Wt 62 kg (136 lb 11 oz)   SpO2 100%   BMI 26.69 kg/m²       Intake/Output Summary (Last 24 hours) at 10/11/2019 0906  Last data filed at 10/11/2019 0848  Gross per 24 hour   Intake 435 ml   Output --   Net 435 ml        PHYSICAL EXAM:  General   well developed, alert, in no acute distress  EENT  Normocephalic, Atraumatic, PERRLA, EOMI, sclera clear  Respiratory   Clear To Auscultation bilaterally - no wheezes, rales, rhonchi, or crackles  Cardiology  Regular Rate and Rythmn  - no murmurs, rubs or gallops  Abdominal  Soft, non-distended, mild epigastric tenderness, positive bowel sounds, no hepatosplenomegaly, no palpable mass  Neurological  No focal neurological deficits noted  Psychological  Oriented x 3. Normal affect.        Lab Data   Recent Results (from the past 12 hour(s))   GLUCOSE, POC    Collection Time: 10/10/19  9:28 PM   Result Value Ref Range    Glucose (POC) 160 (H) 65 - 100 mg/dL    Performed by LAURENCE HARDIN    LIPASE    Collection Time: 10/11/19  5:31 AM   Result Value Ref Range    Lipase 129 73 - 393 U/L   GLUCOSE, POC    Collection Time: 10/11/19  6:43 AM   Result Value Ref Range    Glucose (POC) 93 65 - 100 mg/dL    Performed by Raad Plan          Medications: Reviewed    PMH/ reviewed - no change compared to H&P  Mid-Level Provider: David Hernandez NP   Date/Time:  10/11/2019

## 2019-10-11 NOTE — PROGRESS NOTES
Problem: Falls - Risk of  Goal: *Absence of Falls  Description  Document Viviana Lin Fall Risk and appropriate interventions in the flowsheet.   Outcome: Progressing Towards Goal  Note:   Fall Risk Interventions:  Mobility Interventions: Communicate number of staff needed for ambulation/transfer         Medication Interventions: Teach patient to arise slowly    Elimination Interventions: Call light in reach              Problem: Patient Education: Go to Patient Education Activity  Goal: Patient/Family Education  Outcome: Progressing Towards Goal

## 2019-10-11 NOTE — DISCHARGE SUMMARY
Discharge Summary       PATIENT ID: Piyush Beard  MRN: [id number]   YOB: 1955    DATE OF ADMISSION: 10/7/2019 12:53 PM    DATE OF DISCHARGE: 10/11/2019  PRIMARY CARE PROVIDER: Denise Jordan MD     ATTENDING PHYSICIAN: Nile Beltrán MD  DISCHARGING PROVIDER: Nile Beltrán MD    To contact this individual call [phone] and ask the  to page. If unavailable ask to be transferred the Adult Hospitalist Department. CONSULTATIONS: IP CONSULT TO GASTROENTEROLOGY  IP CONSULT TO GASTROENTEROLOGY  IP CONSULT TO HOSPITALIST    PROCEDURES/SURGERIES: * No surgery found *    [id number] COURSE:   Admission summary      20-year-old -American female with medical history significant for diabetes mellitus, hypertension and pancreatitis presenting ambulatory to the emergency department with chief complaint of recurrent epigastric, left upper quadrant back pain,      Epigastric pain and tenderness likely GERD vs flare up of pancreatitis. She has long standing history of GERD. She has taken Nexium for years until it was stopped a bout a year ago. She does not remember if she had egd nor does she recollect if she was told of ulcer disease. She has been receiving IV PPI BID. Antiacid. Her diet was slowly advanced to GI lite which she tolerated. On the day of discharge,she felt much better and expressed readiness to go home. She is prescribed Protonix 40 mg bid for a month and advised to continue with 40 mg daily afterwards. GI okay for discharge,they would like to follow her up in the office. She may need EGD/colonoscopy. Colonic thickening,this could be incidental.Her symptoms are mainly in the epigastrium   -Given weight loss, age. .she needs colonoscopy and biopsy. She is on asa and plavix after cardiac stent in July. Dual antiplatelets are generally recommended for 6-12 months after stent.  GI would follow up as out patient for EGD and colonoscopy when the antiplatelets can be held safely. I have advised her to discuss with her cardiologist for advise as to when she can safely hold the antiplatelets to go for the endoscopy procedures. CAD ,recent MI in July 2019,s/p stents placed at [facility]  -On aspirin,plavix ,lisinopril, Toprol. #Weight loss  See plan as above     #Chronic pancreatitis  Continue creon   Amylase lipase WNL        #HTN  Continue home dose Lisinopril and Metoprolol     #Mild hypokalemia: resolved. #Mild hyponatremia: resolved     #DM II: on metformin. Accucheks. #Constipation:received millk of magnesia,pericolace. DISCHARGE MEDICATIONS:  Current Discharge Medication List      START taking these medications    Details   calcium carbonate (TUMS) 200 mg calcium (500 mg) chew Take 1 Tab by mouth three (3) times daily (with meals) for 30 days. Qty: 90 Tab, Refills: 0         CONTINUE these medications which have CHANGED    Details   pantoprazole (PROTONIX) 40 mg tablet Take 1 Tab by mouth two (2) times a day for 30 days. Qty: 60 Tab, Refills: 0         CONTINUE these medications which have NOT CHANGED    Details   magnesium oxide (MAG-OX) 400 mg tablet Take 400 mg by mouth two (2) times a day. aspirin delayed-release 81 mg tablet Take 81 mg by mouth daily. clopidogrel (PLAVIX) 75 mg tab Take 75 mg by mouth daily. !! lipase-protease-amylase (CREON) 36,000-114,000- 180,000 unit cpDR capsule Take 2 Caps by mouth three (3) times daily (with meals). !! lipase-protease-amylase (CREON) 36,000-114,000- 180,000 unit cpDR capsule Take 1 Cap by mouth as needed (snacks). metoprolol succinate (TOPROL-XL) 25 mg XL tablet Take 25 mg by mouth daily. lisinopril (PRINIVIL, ZESTRIL) 40 mg tablet Take 40 mg by mouth daily. traMADol (ULTRAM) 50 mg tablet Take 50 mg by mouth every six (6) hours as needed for Pain.       albuterol (PROVENTIL HFA, VENTOLIN HFA) 90 mcg/actuation inhaler Take 2 puffs by inhalation every four (4) hours as needed for Wheezing. Qty: 1 Inhaler, Refills: 1      metFORMIN (GLUCOPHAGE) 500 mg tablet Take 500 mg by mouth two (2) times daily (with meals). ondansetron (ZOFRAN ODT) 4 mg disintegrating tablet Take 1 Tab by mouth every eight (8) hours as needed for Nausea. Qty: 15 Tab, Refills: 0       !! - Potential duplicate medications found. Please discuss with provider. PENDING TEST RESULTS:   At the time of discharge the following test results are still pending: none    FOLLOW UP APPOINTMENTS:    Follow-up Information     Follow up With Specialties Details Why Contact Info    Key Merrill MD Internal Medicine On 1/3/2020 PCP [address] [place] [address]      Maricarmen Smyth MD Gastroenterology, Gastroenterology   [address] [place] [address]  [phone]             ADDITIONAL CARE RECOMMENDATIONS:     DIET: Regular Diet, Cardiac Diet and Diabetic Diet    ACTIVITY: Activity as tolerated    WOUND CARE: NA    EQUIPMENT needed: NA          NOTIFY YOUR PHYSICIAN FOR ANY OF THE FOLLOWING:   Fever over 101 degrees for 24 hours. Chest pain, shortness of breath, fever, chills, nausea, vomiting, diarrhea, change in mentation, falling, weakness, bleeding. Severe pain or pain not relieved by medications. Or, any other signs or symptoms that you may have questions about.     DISPOSITION:  x  Home With:   OT  PT  HH  RN       SNF(Name)    Inpatient Rehab(name)    Independent/assisted living(name)    Hospice    Other:       PATIENT CONDITION AT DISCHARGE:     Functional status        Poor     Deconditioned    x Independent    Cognition       x Lucid    Forgetful    Dementia   Catheter/Lines(indications)     Bliss    PICC    PEG   x None   Code status     x Full    DNR       PHYSICAL EXAMINATION AT DISCHARGE:     Visit Vitals  BP (!) 166/91 (BP 1 Location: Right arm, BP Patient Position: At rest)   Pulse 61   Temp 98.2 °F (36.8 °C)   Resp 16   Ht 5' (1.524 m)   Wt 62 kg (136 lb 11 oz)   SpO2 100%   BMI 26.69 kg/m²      O2 Device: Room air    Temp (24hrs), Av.1 °F (36.7 °C), Min:97.7 °F (36.5 °C), Max:98.3 °F (36.8 °C)    10/11 0701 - 10/11 1900  In: 360 [P.O.:360]  Out: -    10/09 1901 - 10/11 0700  In: 75 [I.V.:75]  Out: -     GENERAL:  Alert, oriented, cooperative, no apparent distress  HEENT:  Normocephalic, atraumatic, non icteric sclerae, non pallor conjuctivae, EOMs intact, PERRLA. NECK: Supple, trachea midline, no adenopathy, no thyromegally or tenderness, no carotid bruit and no JVD. LUNGS:   Vesicular breath sounds bilaterally, no added sounds. HEART:   S1 and S2 well heard,RRR,  no murmur, click, rub or gallop. ABDOMEB:   Soft, non-tender. Normoactive bowel sounds. No masses,  No organomegaly. EXTREMETIES:  Atraumatic, acyanotic, no edema  PULSES: 2+ and symmetric all extremities. SKIN:  keloids on the abdomen. NEUROLOGY: Alert and oriented to PPT, CNII-XII intact. Motor and sensory exam grossly intact. CHRONIC MEDICAL DIAGNOSES:  Problem List as of 10/11/2019 Never Reviewed          Codes Class Noted - Resolved    * (Principal) Abdominal pain ICD-10-CM: R10.9  ICD-9-CM: 789.00  10/8/2019 - Present        Colon wall thickening ICD-10-CM: K63.9  ICD-9-CM: 569.89  10/7/2019 - Present              Greater than 30 minutes were spent with the patient on counseling and coordination of care    Signed:    Jason Chu MD  10/11/2019  9:38 AM

## 2019-10-11 NOTE — PROGRESS NOTES
Bedside shift change report given to Estephanie Shoemaker RN (oncoming nurse) by Unruly Chapin RN (offgoing nurse). Report included the following information SBAR, Kardex and MAR.

## 2019-10-18 ENCOUNTER — HOSPITAL ENCOUNTER (EMERGENCY)
Age: 64
Discharge: HOME | End: 2019-10-18
Attending: EMERGENCY MEDICINE
Payer: MEDICARE

## 2019-10-18 ENCOUNTER — APPOINTMENT (OUTPATIENT)
Dept: VASCULAR SURGERY | Age: 64
End: 2019-10-18
Attending: PHYSICIAN ASSISTANT
Payer: MEDICARE

## 2019-10-18 VITALS
BODY MASS INDEX: 27.09 KG/M2 | DIASTOLIC BLOOD PRESSURE: 87 MMHG | RESPIRATION RATE: 16 BRPM | OXYGEN SATURATION: 98 % | HEART RATE: 84 BPM | TEMPERATURE: 98.6 F | WEIGHT: 138 LBS | HEIGHT: 60 IN | SYSTOLIC BLOOD PRESSURE: 134 MMHG

## 2019-10-18 DIAGNOSIS — L03.113 CELLULITIS OF RIGHT HAND: Primary | ICD-10-CM

## 2019-10-18 LAB
ALBUMIN SERPL-MCNC: 3.7 G/DL (ref 3.5–5)
ALBUMIN/GLOB SERPL: 0.7 {RATIO} (ref 1.1–2.2)
ALP SERPL-CCNC: 154 U/L (ref 45–117)
ALT SERPL-CCNC: 80 U/L (ref 12–78)
ANION GAP SERPL CALC-SCNC: 8 MMOL/L (ref 5–15)
AST SERPL-CCNC: 85 U/L (ref 15–37)
ATRIAL RATE: 82 BPM
BASOPHILS # BLD: 0 K/UL (ref 0–0.1)
BASOPHILS NFR BLD: 1 % (ref 0–1)
BILIRUB SERPL-MCNC: 0.4 MG/DL (ref 0.2–1)
BUN SERPL-MCNC: 11 MG/DL (ref 6–20)
BUN/CREAT SERPL: 17 (ref 12–20)
CALCIUM SERPL-MCNC: 9.5 MG/DL (ref 8.5–10.1)
CALCULATED P AXIS, ECG09: 84 DEGREES
CALCULATED R AXIS, ECG10: -57 DEGREES
CALCULATED T AXIS, ECG11: 86 DEGREES
CHLORIDE SERPL-SCNC: 99 MMOL/L (ref 97–108)
CO2 SERPL-SCNC: 27 MMOL/L (ref 21–32)
COMMENT, HOLDF: NORMAL
CREAT SERPL-MCNC: 0.65 MG/DL (ref 0.55–1.02)
CRP SERPL-MCNC: <0.29 MG/DL (ref 0–0.6)
DIAGNOSIS, 93000: NORMAL
DIFFERENTIAL METHOD BLD: ABNORMAL
EOSINOPHIL # BLD: 0.1 K/UL (ref 0–0.4)
EOSINOPHIL NFR BLD: 2 % (ref 0–7)
ERYTHROCYTE [DISTWIDTH] IN BLOOD BY AUTOMATED COUNT: 13.4 % (ref 11.5–14.5)
ERYTHROCYTE [SEDIMENTATION RATE] IN BLOOD: 31 MM/HR (ref 0–30)
GLOBULIN SER CALC-MCNC: 5.6 G/DL (ref 2–4)
GLUCOSE SERPL-MCNC: 149 MG/DL (ref 65–100)
HCT VFR BLD AUTO: 44.6 % (ref 35–47)
HGB BLD-MCNC: 14.4 G/DL (ref 11.5–16)
IMM GRANULOCYTES # BLD AUTO: 0 K/UL (ref 0–0.04)
IMM GRANULOCYTES NFR BLD AUTO: 0 % (ref 0–0.5)
LYMPHOCYTES # BLD: 3.1 K/UL (ref 0.8–3.5)
LYMPHOCYTES NFR BLD: 53 % (ref 12–49)
MCH RBC QN AUTO: 28.7 PG (ref 26–34)
MCHC RBC AUTO-ENTMCNC: 32.3 G/DL (ref 30–36.5)
MCV RBC AUTO: 89 FL (ref 80–99)
MONOCYTES # BLD: 0.6 K/UL (ref 0–1)
MONOCYTES NFR BLD: 10 % (ref 5–13)
NEUTS SEG # BLD: 2 K/UL (ref 1.8–8)
NEUTS SEG NFR BLD: 34 % (ref 32–75)
NRBC # BLD: 0 K/UL (ref 0–0.01)
NRBC BLD-RTO: 0 PER 100 WBC
P-R INTERVAL, ECG05: 170 MS
PLATELET # BLD AUTO: 192 K/UL (ref 150–400)
PMV BLD AUTO: 9.9 FL (ref 8.9–12.9)
POTASSIUM SERPL-SCNC: 3.3 MMOL/L (ref 3.5–5.1)
PROT SERPL-MCNC: 9.3 G/DL (ref 6.4–8.2)
Q-T INTERVAL, ECG07: 392 MS
QRS DURATION, ECG06: 86 MS
QTC CALCULATION (BEZET), ECG08: 457 MS
RBC # BLD AUTO: 5.01 M/UL (ref 3.8–5.2)
SAMPLES BEING HELD,HOLD: NORMAL
SODIUM SERPL-SCNC: 134 MMOL/L (ref 136–145)
TROPONIN I BLD-MCNC: <0.04 NG/ML (ref 0–0.08)
URATE SERPL-MCNC: 4 MG/DL (ref 2.6–6)
VENTRICULAR RATE, ECG03: 82 BPM
WBC # BLD AUTO: 5.9 K/UL (ref 3.6–11)

## 2019-10-18 PROCEDURE — 84550 ASSAY OF BLOOD/URIC ACID: CPT

## 2019-10-18 PROCEDURE — 96376 TX/PRO/DX INJ SAME DRUG ADON: CPT

## 2019-10-18 PROCEDURE — 36415 COLL VENOUS BLD VENIPUNCTURE: CPT

## 2019-10-18 PROCEDURE — 85652 RBC SED RATE AUTOMATED: CPT

## 2019-10-18 PROCEDURE — 80053 COMPREHEN METABOLIC PANEL: CPT

## 2019-10-18 PROCEDURE — 74011250637 HC RX REV CODE- 250/637: Performed by: EMERGENCY MEDICINE

## 2019-10-18 PROCEDURE — 85025 COMPLETE CBC W/AUTO DIFF WBC: CPT

## 2019-10-18 PROCEDURE — 96374 THER/PROPH/DIAG INJ IV PUSH: CPT

## 2019-10-18 PROCEDURE — 74011250636 HC RX REV CODE- 250/636: Performed by: PHYSICIAN ASSISTANT

## 2019-10-18 PROCEDURE — 93971 EXTREMITY STUDY: CPT

## 2019-10-18 PROCEDURE — 84484 ASSAY OF TROPONIN QUANT: CPT

## 2019-10-18 PROCEDURE — 86140 C-REACTIVE PROTEIN: CPT

## 2019-10-18 PROCEDURE — 74011250636 HC RX REV CODE- 250/636: Performed by: EMERGENCY MEDICINE

## 2019-10-18 PROCEDURE — 99284 EMERGENCY DEPT VISIT MOD MDM: CPT

## 2019-10-18 PROCEDURE — 93005 ELECTROCARDIOGRAM TRACING: CPT

## 2019-10-18 RX ORDER — HYDROMORPHONE HYDROCHLORIDE 1 MG/ML
0.5 INJECTION, SOLUTION INTRAMUSCULAR; INTRAVENOUS; SUBCUTANEOUS
Status: COMPLETED | OUTPATIENT
Start: 2019-10-18 | End: 2019-10-18

## 2019-10-18 RX ORDER — CEPHALEXIN 500 MG/1
500 CAPSULE ORAL
Status: COMPLETED | OUTPATIENT
Start: 2019-10-18 | End: 2019-10-18

## 2019-10-18 RX ORDER — HYDROMORPHONE HYDROCHLORIDE 1 MG/ML
1 INJECTION, SOLUTION INTRAMUSCULAR; INTRAVENOUS; SUBCUTANEOUS ONCE
Status: COMPLETED | OUTPATIENT
Start: 2019-10-18 | End: 2019-10-18

## 2019-10-18 RX ORDER — HYDROCODONE BITARTRATE AND ACETAMINOPHEN 7.5; 325 MG/1; MG/1
1 TABLET ORAL
Qty: 10 TAB | Refills: 0 | Status: SHIPPED | OUTPATIENT
Start: 2019-10-18 | End: 2019-11-17

## 2019-10-18 RX ORDER — CEPHALEXIN 500 MG/1
500 CAPSULE ORAL 3 TIMES DAILY
Qty: 21 CAP | Refills: 0 | Status: SHIPPED | OUTPATIENT
Start: 2019-10-18 | End: 2019-10-18

## 2019-10-18 RX ORDER — CEPHALEXIN 500 MG/1
500 CAPSULE ORAL 3 TIMES DAILY
Qty: 21 CAP | Refills: 0 | Status: SHIPPED | OUTPATIENT
Start: 2019-10-18 | End: 2019-10-25

## 2019-10-18 RX ADMIN — HYDROMORPHONE HYDROCHLORIDE 1 MG: 1 INJECTION, SOLUTION INTRAMUSCULAR; INTRAVENOUS; SUBCUTANEOUS at 18:17

## 2019-10-18 RX ADMIN — HYDROMORPHONE HYDROCHLORIDE 0.5 MG: 1 INJECTION, SOLUTION INTRAMUSCULAR; INTRAVENOUS; SUBCUTANEOUS at 15:50

## 2019-10-18 RX ADMIN — SODIUM CHLORIDE 1000 ML: 900 INJECTION, SOLUTION INTRAVENOUS at 16:11

## 2019-10-18 RX ADMIN — CEPHALEXIN 500 MG: 500 CAPSULE ORAL at 20:27

## 2019-10-18 NOTE — ED NOTES
59 y.o. female with past medical hx for chronic pancreatitis, diabetes, and HTN who presents from home with chief complaint of hand pain. Patient states onset of right hand/forearm pain was ~4 days ago (10/14/19). Noting she was admitted to [facility] from 10/7/19-10/11/19 for acute on chronic pancreatitis flare and GERD, stating she had an IV in her right arm during her admission but had no problems with it when she was discharged on 10/11/19 (~1 week ago). Patient presents to [facility] ED for persisting right hand/forearm pain described as \"sharp. \" Patient endorses her pain begins at the base of her thumb with accompanying warmth and redness which has persisted since onset. Patient denies injuring her hand. Patient denies abdominal pain, nausea, vomiting, and diarrhea. There are no other acute medical concerns at this time. Social hx: No Tobacco use (Former smoker), No EtOH use. PCP: Tea Amezquita MD    Note written by Carmine Contreras, as dictated by Nikunj Ragland MD 5:30 PM    Physical exam  Const: Well developed, well nourished  Head:  Normocephalic. Cardiovascular:  RRR, no murmurs, no gallops, no rubs. Resp:  No resp distress, no increased work of breathing, no wheezes, no rhonchi, no rales. Abd:  Soft, non-tender, non-distended, no rebound, no guarding, no CVA tenderness. MSK:  No pedal edema, Tender right thumb, hand, and forearm. Neuro:  Alert and oriented x3, no cranial nerve defect. Skin:  Warm, dry, intact.   Psych: normal mood and affect, behavior is normal, judgement and thought content is normal.  Note written by Carmine Contrersa, as dictated by Nikunj Ragland MD 5:30 PM

## 2019-10-18 NOTE — ED NOTES
Patient pale a diaphoretic after bloood draw. Patient VSS and feels better approx 5 min after blood draw. EKG done and given to attending.

## 2019-10-18 NOTE — ED PROVIDER NOTES
Marlin Ackerman is a 59 y.o. female with PMH of chronic pancreatitis, diabetes, HTN, chronic pain on Tramadol presents to emergency room ambulatory for evaluation of R hand/forearm pain which began Monday 10/14. Of note she was admitted to [facility] from 10/7-10/11 for acute on chronic pancreatitis flare and GERD. She states she had an IV in her R arm during her admission but had no problems with it when she was discharged on 10/11. 3 days later began to have pain which has progressively worsened. She notes pain began at the base of her thumb with warmth and redness which has persisted. Denies fever, chills, vomiting. Tramadol has not helped with her pain. She is not anticoagulated. PCP: Chad Callahan MD    Surgical hx- see chart  Social hx- former smoker, no ETOH    The patient has no other complaints at this time. Past Medical History:   Diagnosis Date    Diabetes ([address]     Hypertension     Pancreatitis        Past Surgical History:   Procedure Laterality Date    HX CHOLECYSTECTOMY      HX HERNIA REPAIR      HX HYSTERECTOMY      HX ORTHOPAEDIC      right shoulder    HX ORTHOPAEDIC      left shoulder    HX TUBAL LIGATION      NEUROLOGICAL PROCEDURE UNLISTED      cervical fusion         History reviewed. No pertinent family history.     Social History     Socioeconomic History    Marital status: SINGLE     Spouse name: Not on file    Number of children: Not on file    Years of education: Not on file    Highest education level: Not on file   Occupational History    Not on file   Social Needs    Financial resource strain: Not on file    Food insecurity:     Worry: Not on file     Inability: Not on file    Transportation needs:     Medical: Not on file     Non-medical: Not on file   Tobacco Use    Smoking status: Former Smoker     Packs/day: 0.50    Smokeless tobacco: Never Used   Substance and Sexual Activity    Alcohol use: No    Drug use: No    Sexual activity: Not on file   Lifestyle  Physical activity:     Days per week: Not on file     Minutes per session: Not on file    Stress: Not on file   Relationships    Social connections:     Talks on phone: Not on file     Gets together: Not on file     Attends Tenriism service: Not on file     Active member of club or organization: Not on file     Attends meetings of clubs or organizations: Not on file     Relationship status: Not on file    Intimate partner violence:     Fear of current or ex partner: Not on file     Emotionally abused: Not on file     Physically abused: Not on file     Forced sexual activity: Not on file   Other Topics Concern    Not on file   Social History Narrative    Not on file         ALLERGIES: Latex and Morphine    Review of Systems   Constitutional: Negative. Negative for activity change, chills, fatigue and unexpected weight change. HENT: Negative for trouble swallowing. Respiratory: Negative for cough, chest tightness, shortness of breath and wheezing. Cardiovascular: Negative. Negative for chest pain and palpitations. Gastrointestinal: Negative. Negative for abdominal pain, diarrhea, nausea and vomiting. Genitourinary: Negative. Negative for dysuria, flank pain, frequency and hematuria. Musculoskeletal: Positive for joint swelling. Negative for arthralgias, back pain, neck pain and neck stiffness. Skin: Positive for color change. Negative for rash. Neurological: Negative. Negative for dizziness, numbness and headaches. All other systems reviewed and are negative. Vitals:    10/18/19 1524 10/18/19 1537   BP: (!) 153/118    Pulse: 100    Resp: 16    Temp: 98.5 °F (36.9 °C) 99 °F (37.2 °C)   SpO2: 98%    Weight: 62.6 kg (138 lb)    Height: 5' (1.524 m)             Physical Exam   Constitutional: She is oriented to person, place, and time. She appears well-developed and well-nourished. She is active. Non-toxic appearance. She appears distressed.    AA female, uncomfortable appearing HENT:   Head: Normocephalic and atraumatic. Eyes: Pupils are equal, round, and reactive to light. Conjunctivae are normal. Right eye exhibits no discharge. Left eye exhibits no discharge. Neck: Normal range of motion and full passive range of motion without pain. No tracheal tenderness present. Cardiovascular: Normal rate, regular rhythm, normal heart sounds, intact distal pulses and normal pulses. Exam reveals no gallop and no friction rub. No murmur heard. Pulmonary/Chest: Effort normal and breath sounds normal. No respiratory distress. She has no wheezes. She has no rales. She exhibits no tenderness. Abdominal: Soft. Bowel sounds are normal. She exhibits no distension. There is no tenderness. There is no rebound and no guarding. Musculoskeletal: Normal range of motion. She exhibits no edema or tenderness. Neurological: She is alert and oriented to person, place, and time. She has normal strength. No cranial nerve deficit or sensory deficit. Coordination normal.   Skin: Skin is warm, dry and intact. Capillary refill takes less than 2 seconds. No abrasion and no rash noted. She is not diaphoretic. No erythema. Psychiatric: She has a normal mood and affect. Her speech is normal and behavior is normal. Cognition and memory are normal.   Nursing note and vitals reviewed. MDM  Number of Diagnoses or Management Options  Diagnosis management comments:   Ddx: DVT, cellulitis, acute thrombophlebitis, acute gout / arthritis flare       Amount and/or Complexity of Data Reviewed  Clinical lab tests: ordered and reviewed  Tests in the radiology section of CPT®: ordered and reviewed  Review and summarize past medical records: yes  Discuss the patient with other providers: yes  Independent visualization of images, tracings, or specimens: yes    Patient Progress  Patient progress: stable         Procedures      3:49 PM  Patient sitting in wheelchair, now diaphoretic, lightheaded. EKG and vitals ordered. Requested to move patient to stretcher. Has not gotten pain medication yet. Nurse now states patient may have vageled after IV start. Sandrine Glynn PA-C    3:52pm  Discussed with Dr. Moni Sarvaia who will assume care of patient. Sandrine Glynn PA-C      ED EKG interpretation:  Rhythm: normal sinus rhythm; and regular . Rate (approx.): 80; Axis: left axis deviation; P wave: normal; QRS interval: normal ; ST/T wave: ; in  Lead: ; Other findings: . This EKG was interpreted by Wil Amezcua MD,ED Provider.  9:35 PM

## 2019-10-18 NOTE — ED TRIAGE NOTES
Pt reports right arm and right hand pain and swelling that began this past Monday. Pt states the pain and swelling have progressively gotten worse.   Pt also reports she had IV in her arm 11 days ago while in hospital.

## 2019-10-19 NOTE — DISCHARGE INSTRUCTIONS

## 2019-10-19 NOTE — ED NOTES
Patient given discharge instructions. No questions or concerns at this time. Patient's VSS and in no acute distress. Patient wheel out of unit at discharge.

## 2019-11-05 ENCOUNTER — HOSPITAL ENCOUNTER (OUTPATIENT)
Dept: GENERAL RADIOLOGY | Age: 64
Discharge: HOME | End: 2019-11-05
Attending: INTERNAL MEDICINE
Payer: MEDICARE

## 2019-11-05 DIAGNOSIS — R63.4 UNEXPLAINED WEIGHT LOSS: ICD-10-CM

## 2019-11-05 DIAGNOSIS — K85.90 PANCREATITIS: ICD-10-CM

## 2019-11-05 DIAGNOSIS — R93.3 ABNORMAL FINDINGS ON EXAMINATION OF GASTROINTESTINAL TRACT: ICD-10-CM

## 2019-11-05 PROCEDURE — 74280 X-RAY XM COLON 2CNTRST STD: CPT

## 2020-01-31 ENCOUNTER — APPOINTMENT (OUTPATIENT)
Dept: GENERAL RADIOLOGY | Age: 65
End: 2020-01-31
Attending: EMERGENCY MEDICINE
Payer: MEDICARE

## 2020-01-31 ENCOUNTER — HOSPITAL ENCOUNTER (EMERGENCY)
Age: 65
Discharge: HOME | End: 2020-01-31
Attending: EMERGENCY MEDICINE | Admitting: EMERGENCY MEDICINE
Payer: MEDICARE

## 2020-01-31 VITALS
BODY MASS INDEX: 28.33 KG/M2 | OXYGEN SATURATION: 99 % | WEIGHT: 145.06 LBS | HEART RATE: 84 BPM | RESPIRATION RATE: 18 BRPM | TEMPERATURE: 98.4 F

## 2020-01-31 DIAGNOSIS — M25.531 RIGHT WRIST PAIN: Primary | ICD-10-CM

## 2020-01-31 PROCEDURE — 74011250636 HC RX REV CODE- 250/636: Performed by: EMERGENCY MEDICINE

## 2020-01-31 PROCEDURE — 74011250637 HC RX REV CODE- 250/637: Performed by: EMERGENCY MEDICINE

## 2020-01-31 PROCEDURE — 73110 X-RAY EXAM OF WRIST: CPT

## 2020-01-31 PROCEDURE — 99283 EMERGENCY DEPT VISIT LOW MDM: CPT

## 2020-01-31 PROCEDURE — 96372 THER/PROPH/DIAG INJ SC/IM: CPT

## 2020-01-31 RX ORDER — GABAPENTIN 300 MG/1
300 CAPSULE ORAL
Status: COMPLETED | OUTPATIENT
Start: 2020-01-31 | End: 2020-01-31

## 2020-01-31 RX ORDER — KETOROLAC TROMETHAMINE 30 MG/ML
30 INJECTION, SOLUTION INTRAMUSCULAR; INTRAVENOUS
Status: COMPLETED | OUTPATIENT
Start: 2020-01-31 | End: 2020-01-31

## 2020-01-31 RX ORDER — GABAPENTIN 300 MG/1
300 CAPSULE ORAL 3 TIMES DAILY
Qty: 18 CAP | Refills: 0 | Status: SHIPPED | OUTPATIENT
Start: 2020-01-31

## 2020-01-31 RX ADMIN — KETOROLAC TROMETHAMINE 30 MG: 30 INJECTION, SOLUTION INTRAMUSCULAR at 08:55

## 2020-01-31 RX ADMIN — GABAPENTIN 300 MG: 300 CAPSULE ORAL at 08:55

## 2020-01-31 NOTE — ED PROVIDER NOTES
HPI Gaby Henning is a 62 y.o. female with PMH of chronic pancreatitis, diabetes, HTN, chronic pain on Tramadol presents to emergency room ambulatory for evaluation of R hand/forearm pain. He denies any new injuries, falls or direct trauma to the right arm or hand. Skin integrity is intact. There is no obvious deformity, swelling, bruising or erythema. Good neurovascular sensation. No apparent tendon or nerve injury. Pain increases with active range of motion of the right hand. She states that she has been taking tramadol without relief. Old charts reviewed      Past Medical History:   Diagnosis Date    Diabetes ([facility] [address]     Hypertension     Pancreatitis        Past Surgical History:   Procedure Laterality Date    HX CHOLECYSTECTOMY      HX HERNIA REPAIR      HX HYSTERECTOMY      HX ORTHOPAEDIC      right shoulder    HX ORTHOPAEDIC      left shoulder    HX TUBAL LIGATION      NEUROLOGICAL PROCEDURE UNLISTED      cervical fusion         History reviewed. No pertinent family history.     Social History     Socioeconomic History    Marital status: SINGLE     Spouse name: Not on file    Number of children: Not on file    Years of education: Not on file    Highest education level: Not on file   Occupational History    Not on file   Social Needs    Financial resource strain: Not on file    Food insecurity:     Worry: Not on file     Inability: Not on file    Transportation needs:     Medical: Not on file     Non-medical: Not on file   Tobacco Use    Smoking status: Former Smoker     Packs/day: 0.50    Smokeless tobacco: Never Used   Substance and Sexual Activity    Alcohol use: No    Drug use: No    Sexual activity: Not on file   Lifestyle    Physical activity:     Days per week: Not on file     Minutes per session: Not on file    Stress: Not on file   Relationships    Social connections:     Talks on phone: Not on file     Gets together: Not on file     Attends Denominational service: Not on file Active member of club or organization: Not on file     Attends meetings of clubs or organizations: Not on file     Relationship status: Not on file    Intimate partner violence:     Fear of current or ex partner: Not on file     Emotionally abused: Not on file     Physically abused: Not on file     Forced sexual activity: Not on file   Other Topics Concern    Not on file   Social History Narrative    Not on file         ALLERGIES: Latex and Morphine    Review of Systems   Constitutional: Negative for activity change, appetite change, fever and unexpected weight change. HENT: Negative for ear pain, rhinorrhea and trouble swallowing. Eyes: Negative for visual disturbance. Respiratory: Negative for cough and shortness of breath. Cardiovascular: Negative for chest pain, palpitations and leg swelling. Gastrointestinal: Negative for abdominal pain. Genitourinary: Negative for dysuria. Musculoskeletal: Positive for arthralgias. Negative for gait problem, joint swelling and myalgias. Skin: Negative for rash. Neurological: Negative for dizziness and headaches. All other systems reviewed and are negative. Vitals:    01/31/20 0833   Pulse: 84   Resp: 18   Temp: 98.4 °F (36.9 °C)   SpO2: 99%   Weight: 65.8 kg (145 lb 1 oz)            Physical Exam  Vitals signs and nursing note reviewed. Constitutional:       General: She is not in acute distress. Appearance: Normal appearance. She is normal weight. She is not ill-appearing, toxic-appearing or diaphoretic. Comments: Black female; retired; smoker   HENT:      Head: Normocephalic. Right Ear: Tympanic membrane and ear canal normal.      Left Ear: Tympanic membrane and ear canal normal.      Nose: Nose normal.      Mouth/Throat:      Mouth: Mucous membranes are moist.      Pharynx: No posterior oropharyngeal erythema. Neck:      Musculoskeletal: Normal range of motion and neck supple.    Cardiovascular:      Rate and Rhythm: Normal rate and regular rhythm. Pulmonary:      Effort: Pulmonary effort is normal.      Breath sounds: Normal breath sounds. Abdominal:      General: Bowel sounds are normal.      Tenderness: There is no abdominal tenderness. There is no guarding or rebound. Musculoskeletal:         General: Tenderness present. No signs of injury. Comments: Reports right wrist and hand pain with active range of motion;Skin integrity is intact. There is no obvious deformity, bruising, or erythema. Good neurovascular sensation. No apparent tendon or nerve injury. Lymphadenopathy:      Cervical: No cervical adenopathy. Skin:     General: Skin is warm and dry. Findings: No rash. Neurological:      General: No focal deficit present. Mental Status: She is alert and oriented to person, place, and time. MDM       Procedures    Patient was placed in a Velcro wrist splint by the RN for comfort and support; good neurovascular sensation before and after the splint application. She was given referral to pain management clinics and to orthopedic specialist for follow-up as needed. Plan to try short course of Neurontin.  9:26 AM  Patient's result and plan of care have been reviewed with her. Patient and/or family have verbally conveyed their understanding and agreement of the patient's signs, symptoms, diagnosis, treatment and prognosis and additionally agree to follow up as recommended or return to the Emergency Room should her condition change prior to follow-up. Discharge instructions have also been provided to the patient with some educational information regarding her diagnosis as well a list of reasons why she would want to return to the ER prior to her follow-up appointment should her condition change. Irene Brock NP

## 2020-01-31 NOTE — DISCHARGE INSTRUCTIONS
Patient Education   Patient Education        Joint Pain: Care Instructions  Your Care Instructions    Many people have small aches and pains from overuse or injury to muscles and joints. Joint injuries often happen during sports or recreation, work tasks, or projects around the home. An overuse injury can happen when you put too much stress on a joint or when you do an activity that stresses the joint over and over, such as using the computer or rowing a boat. You can take action at home to help your muscles and joints get better. You should feel better in 1 to 2 weeks, but it can take 3 months or more to heal completely. Follow-up care is a key part of your treatment and safety. Be sure to make and go to all appointments, and call your doctor if you are having problems. It's also a good idea to know your test results and keep a list of the medicines you take. How can you care for yourself at home? · Do not put weight on the injured joint for at least a day or two. · For the first day or two after an injury, do not take hot showers or baths, and do not use hot packs. The heat could make swelling worse. · Put ice or a cold pack on the sore joint for 10 to 20 minutes at a time. Try to do this every 1 to 2 hours for the next 3 days (when you are awake) or until the swelling goes down. Put a thin cloth between the ice and your skin. · Wrap the injury in an elastic bandage. Do not wrap it too tightly because this can cause more swelling. · Prop up the sore joint on a pillow when you ice it or anytime you sit or lie down during the next 3 days. Try to keep it above the level of your heart. This will help reduce swelling. · Take an over-the-counter pain medicine, such as acetaminophen (Tylenol), ibuprofen (Advil, Motrin), or naproxen (Aleve). Read and follow all instructions on the label. · After 1 or 2 days of rest, begin moving the joint gently.  While the joint is still healing, you can begin to exercise using activities that do not strain or hurt the painful joint. When should you call for help? Call your doctor now or seek immediate medical care if:    · You have signs of infection, such as:  ? Increased pain, swelling, warmth, and redness. ? Red streaks leading from the joint. ? A fever.    Watch closely for changes in your health, and be sure to contact your doctor if:    · Your movement or symptoms are not getting better after 1 to 2 weeks of home treatment. Where can you learn more? Go to http://vincent-marlo.info/. Enter P205 in the search box to learn more about \"Joint Pain: Care Instructions. \"  Current as of: June 26, 2019  Content Version: 12.2  © 8494-7477 [employer]. Care instructions adapted under license by [employer] (which disclaims liability or warranty for this information). If you have questions about a medical condition or this instruction, always ask your healthcare professional. [place] [address] any warranty or liability for your use of this information. Learning About Making a Plan for Managing Chronic Pain  How can you plan for managing your pain? You and your doctor will work to make your plan. Your plan can include more than one type of pain control. You may take prescription or over-the-counter drugs. You can also use physical treatments, like massage and acupuncture. Other things can help too, such as meditation or a type of counseling to change how you think about your pain. It's important to let your doctor know how your pain is affecting your life. The goal of a pain management plan isn't to totally get rid of pain. Instead, the goal is to control the pain enough so that daily activities are easier. If your pain isn't controlled well enough, talk with your doctor. You may need to make a new plan.  Or your doctor may refer you to a specialist.  Sample pain management plan  Here is an example of a pain management plan. You can work with your doctor to complete it. My Goals  Example: \"Control pain long enough to walk the dog each day\" or \"Reduce or stop taking pain medicines by the end of summer. \"   ________________________________________________  ________________________________________________  ________________________________________________  ________________________________________________  My Non-Medicine Strategies  Example: \"Complete my physical therapy exercises each day\" or \"Do yoga twice a week. \"  ________________________________________________  ________________________________________________  ________________________________________________  My Medicines That Help With Pain  Include prescription and over-the-counter medicines. Medicine: ______________________  How much to take: ______________________________  How often to take it: ______________________________  Other instructions: ______________________________  Medicine: ______________________  How much to take: ______________________________  How often to take it: ______________________________  Other instructions: ______________________________  My Next Steps  If I'm not meeting my goals, my doctor recommends:  ________________________________________________  ________________________________________________  ________________________________________________  Where can you learn more? Go to http://vincent-marlo.info/. Enter P490 in the search box to learn more about \"Learning About Making a Plan for Managing Chronic Pain. \"  Current as of: March 28, 2019  Content Version: 12.2  © 2934-1566 [employer]. Care instructions adapted under license by [employer] (which disclaims liability or warranty for this information).  If you have questions about a medical condition or this instruction, always ask your healthcare professional. [address] any warranty or liability for your use of this information.

## 2020-01-31 NOTE — ED TRIAGE NOTES
Triage: Patient arrives ambulatory from home with c/o right arm pain following multiple IV sticks for MRI last Monday. Denies fevers/chills.

## 2022-03-20 PROBLEM — K63.9 COLON WALL THICKENING: Status: ACTIVE | Noted: 2019-10-07

## 2022-03-20 PROBLEM — R10.9 ABDOMINAL PAIN: Status: ACTIVE | Noted: 2019-10-08

## 2022-04-30 ENCOUNTER — APPOINTMENT (OUTPATIENT)
Dept: CT IMAGING | Age: 67
End: 2022-04-30
Attending: PHYSICIAN ASSISTANT
Payer: MEDICARE

## 2022-04-30 ENCOUNTER — HOSPITAL ENCOUNTER (EMERGENCY)
Age: 67
Discharge: HOME | End: 2022-04-30
Attending: EMERGENCY MEDICINE | Admitting: EMERGENCY MEDICINE
Payer: MEDICARE

## 2022-04-30 VITALS
BODY MASS INDEX: 33.38 KG/M2 | WEIGHT: 170 LBS | RESPIRATION RATE: 14 BRPM | HEIGHT: 60 IN | HEART RATE: 70 BPM | OXYGEN SATURATION: 100 % | SYSTOLIC BLOOD PRESSURE: 120 MMHG | TEMPERATURE: 98.4 F | DIASTOLIC BLOOD PRESSURE: 66 MMHG

## 2022-04-30 DIAGNOSIS — R51.9 NONINTRACTABLE HEADACHE, UNSPECIFIED CHRONICITY PATTERN, UNSPECIFIED HEADACHE TYPE: Primary | ICD-10-CM

## 2022-04-30 DIAGNOSIS — I10 HYPERTENSION, UNSPECIFIED TYPE: ICD-10-CM

## 2022-04-30 LAB
ALBUMIN SERPL-MCNC: 4 G/DL (ref 3.5–5)
ALBUMIN/GLOB SERPL: 1.1 {RATIO} (ref 1.1–2.2)
ALP SERPL-CCNC: 293 U/L (ref 45–117)
ALT SERPL-CCNC: 43 U/L (ref 12–78)
ANION GAP SERPL CALC-SCNC: 7 MMOL/L (ref 5–15)
AST SERPL-CCNC: 19 U/L (ref 15–37)
BASOPHILS # BLD: 0 K/UL (ref 0–0.1)
BASOPHILS NFR BLD: 0 % (ref 0–1)
BILIRUB SERPL-MCNC: 0.1 MG/DL (ref 0.2–1)
BUN SERPL-MCNC: 28 MG/DL (ref 6–20)
BUN/CREAT SERPL: 18 (ref 12–20)
CALCIUM SERPL-MCNC: 9.5 MG/DL (ref 8.5–10.1)
CHLORIDE SERPL-SCNC: 104 MMOL/L (ref 97–108)
CO2 SERPL-SCNC: 27 MMOL/L (ref 21–32)
COMMENT, HOLDF: NORMAL
CREAT SERPL-MCNC: 1.53 MG/DL (ref 0.55–1.02)
DIFFERENTIAL METHOD BLD: ABNORMAL
EOSINOPHIL # BLD: 0.1 K/UL (ref 0–0.4)
EOSINOPHIL NFR BLD: 2 % (ref 0–7)
ERYTHROCYTE [DISTWIDTH] IN BLOOD BY AUTOMATED COUNT: 14.9 % (ref 11.5–14.5)
GLOBULIN SER CALC-MCNC: 3.6 G/DL (ref 2–4)
GLUCOSE SERPL-MCNC: 167 MG/DL (ref 65–100)
HCT VFR BLD AUTO: 34.5 % (ref 35–47)
HGB BLD-MCNC: 10.6 G/DL (ref 11.5–16)
IMM GRANULOCYTES # BLD AUTO: 0 K/UL (ref 0–0.04)
IMM GRANULOCYTES NFR BLD AUTO: 1 % (ref 0–0.5)
LYMPHOCYTES # BLD: 2 K/UL (ref 0.8–3.5)
LYMPHOCYTES NFR BLD: 48 % (ref 12–49)
MAGNESIUM SERPL-MCNC: 1.8 MG/DL (ref 1.6–2.4)
MCH RBC QN AUTO: 26.6 PG (ref 26–34)
MCHC RBC AUTO-ENTMCNC: 30.7 G/DL (ref 30–36.5)
MCV RBC AUTO: 86.7 FL (ref 80–99)
MONOCYTES # BLD: 0.6 K/UL (ref 0–1)
MONOCYTES NFR BLD: 13 % (ref 5–13)
NEUTS SEG # BLD: 1.5 K/UL (ref 1.8–8)
NEUTS SEG NFR BLD: 36 % (ref 32–75)
NRBC # BLD: 0 K/UL (ref 0–0.01)
NRBC BLD-RTO: 0 PER 100 WBC
PLATELET # BLD AUTO: 248 K/UL (ref 150–400)
PMV BLD AUTO: 9.2 FL (ref 8.9–12.9)
POTASSIUM SERPL-SCNC: 3.7 MMOL/L (ref 3.5–5.1)
PROT SERPL-MCNC: 7.6 G/DL (ref 6.4–8.2)
RBC # BLD AUTO: 3.98 M/UL (ref 3.8–5.2)
SAMPLES BEING HELD,HOLD: NORMAL
SODIUM SERPL-SCNC: 138 MMOL/L (ref 136–145)
TROPONIN-HIGH SENSITIVITY: 21 NG/L (ref 0–51)
TROPONIN-HIGH SENSITIVITY: 22 NG/L (ref 0–51)
WBC # BLD AUTO: 4.3 K/UL (ref 3.6–11)

## 2022-04-30 PROCEDURE — 70450 CT HEAD/BRAIN W/O DYE: CPT

## 2022-04-30 PROCEDURE — 74011250636 HC RX REV CODE- 250/636: Performed by: EMERGENCY MEDICINE

## 2022-04-30 PROCEDURE — 74011000250 HC RX REV CODE- 250: Performed by: EMERGENCY MEDICINE

## 2022-04-30 PROCEDURE — 85025 COMPLETE CBC W/AUTO DIFF WBC: CPT

## 2022-04-30 PROCEDURE — 80053 COMPREHEN METABOLIC PANEL: CPT

## 2022-04-30 PROCEDURE — 96374 THER/PROPH/DIAG INJ IV PUSH: CPT

## 2022-04-30 PROCEDURE — 83735 ASSAY OF MAGNESIUM: CPT

## 2022-04-30 PROCEDURE — 84484 ASSAY OF TROPONIN QUANT: CPT

## 2022-04-30 PROCEDURE — 36415 COLL VENOUS BLD VENIPUNCTURE: CPT

## 2022-04-30 PROCEDURE — 96375 TX/PRO/DX INJ NEW DRUG ADDON: CPT

## 2022-04-30 PROCEDURE — 99284 EMERGENCY DEPT VISIT MOD MDM: CPT

## 2022-04-30 RX ORDER — PROCHLORPERAZINE MALEATE 10 MG
10 TABLET ORAL
Qty: 12 TABLET | Refills: 0 | Status: SHIPPED | OUTPATIENT
Start: 2022-04-30 | End: 2022-05-07

## 2022-04-30 RX ORDER — DIPHENHYDRAMINE HYDROCHLORIDE 50 MG/ML
25 INJECTION, SOLUTION INTRAMUSCULAR; INTRAVENOUS
Status: COMPLETED | OUTPATIENT
Start: 2022-04-30 | End: 2022-04-30

## 2022-04-30 RX ORDER — LABETALOL HYDROCHLORIDE 5 MG/ML
10 INJECTION, SOLUTION INTRAVENOUS ONCE
Status: COMPLETED | OUTPATIENT
Start: 2022-04-30 | End: 2022-04-30

## 2022-04-30 RX ORDER — KETOROLAC TROMETHAMINE 30 MG/ML
15 INJECTION, SOLUTION INTRAMUSCULAR; INTRAVENOUS
Status: COMPLETED | OUTPATIENT
Start: 2022-04-30 | End: 2022-04-30

## 2022-04-30 RX ADMIN — DIPHENHYDRAMINE HYDROCHLORIDE 25 MG: 50 INJECTION INTRAMUSCULAR; INTRAVENOUS at 18:54

## 2022-04-30 RX ADMIN — PROCHLORPERAZINE EDISYLATE 10 MG: 5 INJECTION INTRAMUSCULAR; INTRAVENOUS at 18:53

## 2022-04-30 RX ADMIN — LABETALOL HYDROCHLORIDE 10 MG: 5 INJECTION INTRAVENOUS at 18:54

## 2022-04-30 RX ADMIN — SODIUM CHLORIDE 1000 ML: 9 INJECTION, SOLUTION INTRAVENOUS at 20:33

## 2022-04-30 RX ADMIN — KETOROLAC TROMETHAMINE 15 MG: 30 INJECTION, SOLUTION INTRAMUSCULAR at 20:33

## 2022-04-30 NOTE — ED TRIAGE NOTES
Pt ambulatory to ED with c/o headache and hx of HTN. Pt seen at [facility] 2 days ago for same. Pt reports \"I just want a second opinion. They told me to take Fioricet and Tramadol for the headache\".

## 2022-04-30 NOTE — ED PROVIDER NOTES
HPI   81-year-old female with a past medical history of hypertension, diabetes, chronic pancreatitis, coronary artery disease, and a prior liver transplant presents to the emergency department due to headache. Patient's symptoms have been ongoing over the last 2 weeks and have worsened over the last 3 or 4 days. She says she has a sharp headache all throughout her head. Patient says her symptoms corresponded with her blood pressure being uncontrolled at home. She was seen at [facility] 2 days ago for similar symptoms. The notes indicate that the patient was given a migraine cocktail and was ultimately discharged. She followed up with her transplant team over the phone and she was prescribed losartan but her blood pressure still been elevated up into the 915Q and 265 systolic at home. She does tell me she has a history of headaches but these headaches are different. Her normal headaches are throbbing in nature and are associated with photophobia. Her current headaches are not like this. She does endorse nausea but has not been vomiting. She denies fevers or chills. She says she has been shaking all over, which happens when she gets in a lot of pain. She is not anticoagulated. She denies any focal numbness or weakness. Past Medical History:   Diagnosis Date    Diabetes ([address]     Hypertension     Pancreatitis        Past Surgical History:   Procedure Laterality Date    HX CHOLECYSTECTOMY      HX HERNIA REPAIR      HX HYSTERECTOMY      HX ORTHOPAEDIC      right shoulder    HX ORTHOPAEDIC      left shoulder    HX TUBAL LIGATION      NEUROLOGICAL PROCEDURE UNLISTED      cervical fusion         History reviewed. No pertinent family history.     Social History     Socioeconomic History    Marital status: SINGLE     Spouse name: Not on file    Number of children: Not on file    Years of education: Not on file    Highest education level: Not on file   Occupational History    Not on file   Tobacco Use  Smoking status: Former Smoker     Packs/day: 0.50    Smokeless tobacco: Never Used   Substance and Sexual Activity    Alcohol use: No    Drug use: No    Sexual activity: Not on file   Other Topics Concern    Not on file   Social History Narrative    Not on file     Social Determinants of Health     Financial Resource Strain:     Difficulty of Paying Living Expenses: Not on file   Food Insecurity:     Worried About Running Out of Food in the Last Year: Not on file    Arlin of Food in the Last Year: Not on file   Transportation Needs:     Lack of Transportation (Medical): Not on file    Lack of Transportation (Non-Medical): Not on file   Physical Activity:     Days of Exercise per Week: Not on file    Minutes of Exercise per Session: Not on file   Stress:     Feeling of Stress : Not on file   Social Connections:     Frequency of Communication with Friends and Family: Not on file    Frequency of Social Gatherings with Friends and Family: Not on file    Attends Yarsani Services: Not on file    Active Member of [address] [place] [address] or Organizations: Not on file    Attends Club or Organization Meetings: Not on file    Marital Status: Not on file   Intimate Partner Violence:     Fear of Current or Ex-Partner: Not on file    Emotionally Abused: Not on file    Physically Abused: Not on file    Sexually Abused: Not on file   Housing Stability:     Unable to Pay for Housing in the Last Year: Not on file    Number of [place] in the Last Year: Not on file    Unstable Housing in the Last Year: Not on file         ALLERGIES: Latex and Morphine    Review of Systems   A complete review of systems was performed and all systems reviewed are negative unless otherwise documented in the HPI.     Vitals:    04/30/22 1717   BP: (!) 156/90   Pulse: 83   Resp: 16   Temp: 98.4 °F (36.9 °C)   SpO2: 98%   Weight: 77.1 kg (170 lb)   Height: 5' (1.524 m)            Physical Exam  Constitutional:       Comments: Patient appears uncomfortable and in pain. She is not toxic. HENT:      Head:      Comments: No signs of head trauma     Mouth/Throat:      Comments: Moist mucous membranes  Eyes:      Extraocular Movements: Extraocular movements intact. Comments: Pupils are 3 mm and reactive to light bilaterally. Neck:      Comments: Trachea midline. Normal range of motion of the neck. No carotid bruits. Cardiovascular:      Comments: Regular rate and rhythm. No appreciable murmurs. 2+ radial pulses bilaterally. Pulmonary:      Effort: Pulmonary effort is normal. No respiratory distress. Breath sounds: Normal breath sounds. No wheezing or rales. Abdominal:      General: There is no distension. Palpations: Abdomen is soft. Tenderness: There is no abdominal tenderness. Musculoskeletal:         General: Normal range of motion. Right lower leg: No edema. Left lower leg: No edema. Skin:     General: Skin is warm and dry. Neurological:      Comments: Awake and alert. Speech is normal.  GCS 15. Moves all extremities equally. MDM   80-year-old female presents with the above chief complaint. Initial blood pressure was 156/90 in triage but the time she was roomed it was in the 482B and 313 systolic. She does not have any focal neurologic deficits. She does appear quite uncomfortable. Head CT ordered as have basic labs. I am going to give her a migraine cocktail and see if I can get her blood pressure under control as well. Patient seems very aware and knowledgeable about her health and underlying conditions and she is adamant that her symptoms are because her blood pressure is elevated. If I have difficulty controlling her blood pressures and going to consider being more aggressive with blood pressure management as her symptoms may be secondary to hypertensive urgency/emergency. Work-up is reassuring. Her creatinine appears to be around her baseline at about 1.5.   Her head CT is unremarkable. After patient received a migraine cocktail and some labetalol for vital signs significantly improved and her headache was no longer present. She says her symptoms had completely resolved and she was feeling well and wanted to go home which I felt was reasonable. I did encourage her to keep her appointment at the headache clinic at [facility] and to also follow-up with her primary care doctor regarding her labile blood pressures. Return precautions provided and patient discharged in stable condition.     Procedures

## 2022-04-30 NOTE — ED NOTES
5:40 PM  65yo with hx of HTN, liver transplant 1.5 years ago at [facility] here with 3 weeks of headache, sharp, stabbing, intermittent lightheadedness, intermittent tingling in all arms and legs, worse the past week. Seen at [facility] ER 2 days ago and given migraine cocktail. No imaging done. Transplant team added losartan due to high BP readings which she began yesterday. Protocol orders placed and slotted for core. I have evaluated the patient as the Provider in Triage. I have reviewed Her vital signs and the triage nurse assessment. I have talked with the patient and any available family and advised that I am the provider in triage and have ordered the appropriate study to initiate their work up based on the clinical presentation during my assessment. I have advised that the patient will be accommodated in the Main ED as soon as possible. I have also requested to contact the triage nurse or myself immediately if the patient experiences any changes in their condition during this brief waiting period.   Kiah Mejia PA-C

## 2022-05-01 NOTE — DISCHARGE INSTRUCTIONS
Please follow-up with your primary care doctor regarding your high blood pressure and I also recommend you go to the headache clinic that you were referred to at [facility]. Return to the emergency department with any recurrent symptoms or new concerns.

## 2022-06-08 ENCOUNTER — HOSPITAL ENCOUNTER (EMERGENCY)
Age: 67
Discharge: HOME | End: 2022-06-08
Attending: EMERGENCY MEDICINE
Payer: MEDICARE

## 2022-06-08 ENCOUNTER — APPOINTMENT (OUTPATIENT)
Dept: GENERAL RADIOLOGY | Age: 67
End: 2022-06-08
Attending: NURSE PRACTITIONER
Payer: MEDICARE

## 2022-06-08 VITALS
DIASTOLIC BLOOD PRESSURE: 92 MMHG | TEMPERATURE: 98.2 F | HEART RATE: 78 BPM | RESPIRATION RATE: 18 BRPM | SYSTOLIC BLOOD PRESSURE: 136 MMHG | OXYGEN SATURATION: 99 %

## 2022-06-08 DIAGNOSIS — M25.559 HIP PAIN: Primary | ICD-10-CM

## 2022-06-08 DIAGNOSIS — R11.0 NAUSEA WITHOUT VOMITING: ICD-10-CM
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
## 2022-06-08 PROCEDURE — 73502 X-RAY EXAM HIP UNI 2-3 VIEWS: CPT

## 2022-06-08 PROCEDURE — 74011250637 HC RX REV CODE- 250/637: Performed by: NURSE PRACTITIONER

## 2022-06-08 PROCEDURE — 74011000250 HC RX REV CODE- 250: Performed by: NURSE PRACTITIONER

## 2022-06-08 PROCEDURE — 99283 EMERGENCY DEPT VISIT LOW MDM: CPT

## 2022-06-08 RX ORDER — CYCLOBENZAPRINE HCL 10 MG
10 TABLET ORAL
Status: COMPLETED | OUTPATIENT
Start: 2022-06-08 | End: 2022-06-08

## 2022-06-08 RX ORDER — CYCLOBENZAPRINE HCL 10 MG
10 TABLET ORAL
Qty: 10 TABLET | Refills: 0 | Status: SHIPPED | OUTPATIENT
Start: 2022-06-08

## 2022-06-08 RX ORDER — OXYCODONE HYDROCHLORIDE 5 MG/1
2.5 TABLET ORAL
Status: COMPLETED | OUTPATIENT
Start: 2022-06-08 | End: 2022-06-08

## 2022-06-08 RX ORDER — LIDOCAINE 4 G/100G
1 PATCH TOPICAL EVERY 24 HOURS
Status: DISCONTINUED | OUTPATIENT
Start: 2022-06-08 | End: 2022-06-08 | Stop reason: HOSPADM

## 2022-06-08 RX ORDER — ONDANSETRON 4 MG/1
8 TABLET, ORALLY DISINTEGRATING ORAL
Status: COMPLETED | OUTPATIENT
Start: 2022-06-08 | End: 2022-06-08

## 2022-06-08 RX ORDER — LIDOCAINE 50 MG/G
PATCH TOPICAL
Qty: 10 EACH | Refills: 0 | Status: SHIPPED | OUTPATIENT
Start: 2022-06-08 | End: 2022-06-18

## 2022-06-08 RX ORDER — ONDANSETRON 4 MG/1
4 TABLET, FILM COATED ORAL
Qty: 8 TABLET | Refills: 0 | Status: SHIPPED | OUTPATIENT
Start: 2022-06-08

## 2022-06-08 RX ADMIN — ONDANSETRON 8 MG: 4 TABLET, ORALLY DISINTEGRATING ORAL at 14:00

## 2022-06-08 RX ADMIN — CYCLOBENZAPRINE 10 MG: 10 TABLET, FILM COATED ORAL at 13:14

## 2022-06-08 RX ADMIN — OXYCODONE 2.5 MG: 5 TABLET ORAL at 14:00

## 2022-06-08 NOTE — ED PROVIDER NOTES
71-year-old female with past medical history of diabetes, hypertension, pancreatitis and liver transplant presents via wheelchair with family stating that she fell 2 days ago while at the nail salon. Patient states that she went to sit down in the chair and the chair slipped out and she fell onto her right hip. Patient observed ambulatory in the waiting room with steady gait independently, states that she is having intermittent leg numbness and right foot swelling. Denies hitting her head or LOC. Denies fever, chills, loss of control of bowel or bladder, chest pain, shortness of breath. States that she has been applying heat and intermittent Tylenol with minimal relief. Past Medical History:   Diagnosis Date    [address] ([facility] [address]     Hypertension     Pancreatitis        Past Surgical History:   Procedure Laterality Date    HX CHOLECYSTECTOMY      HX HERNIA REPAIR      HX HYSTERECTOMY      HX ORTHOPAEDIC      right shoulder    HX ORTHOPAEDIC      left shoulder    HX TUBAL LIGATION      NEUROLOGICAL PROCEDURE UNLISTED      cervical fusion         History reviewed. No pertinent family history.     Social History     Socioeconomic History    Marital status: SINGLE     Spouse name: Not on file    Number of children: Not on file    Years of education: Not on file    Highest education level: Not on file   Occupational History    Not on file   Tobacco Use    Smoking status: Former Smoker     Packs/day: 0.50    Smokeless tobacco: Never Used   Substance and Sexual Activity    Alcohol use: No    Drug use: No    Sexual activity: Not on file   Other Topics Concern    Not on file   Social History Narrative    Not on file     Social Determinants of Health     Financial Resource Strain:     Difficulty of Paying Living Expenses: Not on file   Food Insecurity:     Worried About Running Out of Food in the Last Year: Not on file    Arlin of Food in the Last Year: Not on file   Transportation Needs:  Lack of Transportation (Medical): Not on file    Lack of Transportation (Non-Medical): Not on file   Physical Activity:     Days of Exercise per Week: Not on file    Minutes of Exercise per Session: Not on file   Stress:     Feeling of Stress : Not on file   Social Connections:     Frequency of Communication with Friends and Family: Not on file    Frequency of Social Gatherings with Friends and Family: Not on file    Attends Christianity Services: Not on file    Active Member of [address] [place] [address] or Organizations: Not on file    Attends Club or Organization Meetings: Not on file    Marital Status: Not on file   Intimate Partner Violence:     Fear of Current or Ex-Partner: Not on file    Emotionally Abused: Not on file    Physically Abused: Not on file    Sexually Abused: Not on file   Housing Stability:     Unable to Pay for Housing in the Last Year: Not on file    Number of [place] in the Last Year: Not on file    Unstable Housing in the Last Year: Not on file         ALLERGIES: Latex and Morphine    Review of Systems   Constitutional: Negative for chills and fever. HENT: Negative. Respiratory: Negative for cough and shortness of breath. Cardiovascular: Positive for leg swelling. Negative for chest pain. BLE. Gastrointestinal: Positive for nausea. Negative for abdominal pain, diarrhea and vomiting. Genitourinary: Negative for difficulty urinating, dysuria, flank pain and frequency. Musculoskeletal: Positive for myalgias. Right hip pain. Skin: Negative for color change and wound. Neurological: Negative for dizziness, weakness, light-headedness and headaches. Hematological: Does not bruise/bleed easily. Psychiatric/Behavioral: Negative for confusion. Vitals:    06/08/22 1105 06/08/22 1523   BP: (!) 141/104 (!) 136/92   Pulse: 76 78   Resp: 18 18   Temp: 98 °F (36.7 °C) 98.2 °F (36.8 °C)   SpO2: 99% 99%            Physical Exam  Vitals and nursing note reviewed. Constitutional:       Appearance: Normal appearance. HENT:      Head: Normocephalic. Nose:      Comments: Mask in place  Cardiovascular:      Rate and Rhythm: Normal rate and regular rhythm. Pulses: Normal pulses. Pulmonary:      Effort: Pulmonary effort is normal.      Breath sounds: No wheezing. Abdominal:      General: Abdomen is flat. Palpations: Abdomen is soft. Tenderness: There is no abdominal tenderness. Musculoskeletal:      Cervical back: Normal range of motion. Comments: From midPoint tenderness over the right hip, pain radiates into the right groin. 2+ palpable pulse to the right lower extremity, vascularly intact sensation intact, capillary refill less than 2 seconds. On exam left knee with more edema than right, trace edema noted, nonpitting. L>R. Strength 5/5 to BLE. Skin:     General: Skin is warm and dry. Capillary Refill: Capillary refill takes less than 2 seconds. Neurological:      General: No focal deficit present. Mental Status: She is alert and oriented to person, place, and time. Psychiatric:         Mood and Affect: Mood normal.         Thought Content: Thought content normal.          MDM  Number of Diagnoses or Management Options  Fall, initial encounter: established and improving  Hip pain: established and improving  Nausea without vomiting: established and improving     Amount and/or Complexity of Data Reviewed  Tests in the radiology section of CPT®: ordered and reviewed           Procedures    VITAL SIGNS:  Patient Vitals for the past 4 hrs:   Temp Pulse Resp BP SpO2   06/08/22 1523 98.2 °F (36.8 °C) 78 18 (!) 136/92 99 %         LABS:  No results found for this or any previous visit (from the past 6 hour(s)). IMAGING:  XR HIP RT W OR WO PELV 2-3 VWS   Final Result   No acute abnormality.             Medications During Visit:  Medications   cyclobenzaprine (FLEXERIL) tablet 10 mg (10 mg Oral Given 6/8/22 1314)   oxyCODONE IR (ROXICODONE) tablet 2.5 mg (2.5 mg Oral Given 6/8/22 1400)   ondansetron (ZOFRAN ODT) tablet 8 mg (8 mg Oral Given 6/8/22 1400)         DECISION MAKING:  Mary Kimball is a 73 y.o. female who comes in as above. Appearing 71-year-old female presents via wheelchair with complaints of right hip pain after going to sit down into a beauty salon chair and the chair slipped from under her she said that she landed directly onto her right hip. Patient was assisted up and has been ambulatory since, taking Tylenol and applying heat with minimal relief. Patient also endorses mild nausea, denies fever, chills, abdominal pain, chest pain, shortness of breath. No recent long car trips denies previous history of DVT. Plan discussed with patient to obtain x-ray and medicate for discomfort. Patient has been observed ambulatory in department independently with steady gait with no use of DME.  1320-patient reevaluated, pain improved, patient ambulatory with slow steady gait. Discussed imaging results with patient, unremarkable for acute fracture. Discussed follow-up with spine specialist if needed or PCP. Patient advised to ice rest, provided with Rx for muscle relaxer, lidocaine patch and will take Tylenol as needed. IMPRESSION:  1. Hip pain    2. Fall, initial encounter    3. Nausea without vomiting        DISPOSITION:  Discharged      Discharge Medication List as of 6/8/2022  3:06 PM      START taking these medications    Details   lidocaine (LIDODERM) 5 % Apply patch to the affected area for 12 hours a day and remove for 12 hours a day., Normal, Disp-10 Each, R-0      cyclobenzaprine (FLEXERIL) 10 mg tablet Take 1 Tablet by mouth three (3) times daily as needed for Muscle Spasm(s). , Print, Disp-10 Tablet, R-0      ondansetron hcl (Zofran) 4 mg tablet Take 1 Tablet by mouth every eight (8) hours as needed for Nausea or Vomiting for up to 8 doses. , Normal, Disp-8 Tablet, R-0         CONTINUE these medications which have NOT CHANGED    Details   gabapentin (NEURONTIN) 300 mg capsule Take 1 Cap by mouth three (3) times daily. Max Daily Amount: 900 mg., Print, Disp-18 Cap, R-0      magnesium oxide (MAG-OX) 400 mg tablet Take 400 mg by mouth two (2) times a day., Historical Med      aspirin delayed-release 81 mg tablet Take 81 mg by mouth daily. , Historical Med      clopidogrel (PLAVIX) 75 mg tab Take 75 mg by mouth daily. , Historical Med      !! lipase-protease-amylase (CREON) 36,000-114,000- 180,000 unit cpDR capsule Take 2 Caps by mouth three (3) times daily (with meals). , Historical Med      !! lipase-protease-amylase (CREON) 36,000-114,000- 180,000 unit cpDR capsule Take 1 Cap by mouth as needed (snacks). , Historical Med      metoprolol succinate (TOPROL-XL) 25 mg XL tablet Take 25 mg by mouth daily. , Historical Med      traMADol (ULTRAM) 50 mg tablet Take 50 mg by mouth every six (6) hours as needed for Pain., Historical Med      albuterol (PROVENTIL HFA, VENTOLIN HFA) 90 mcg/actuation inhaler Take 2 puffs by inhalation every four (4) hours as needed for Wheezing., Print, Disp-1 Inhaler, R-1      lisinopril (PRINIVIL, ZESTRIL) 40 mg tablet Take 40 mg by mouth daily. , Historical Med      metFORMIN (GLUCOPHAGE) 500 mg tablet Take 500 mg by mouth two (2) times daily (with meals). , Historical Med      ondansetron (ZOFRAN ODT) 4 mg disintegrating tablet Take 1 Tab by mouth every eight (8) hours as needed for Nausea. , Print, Disp-15 Tab, R-0       !! - Potential duplicate medications found. Please discuss with provider. Follow-up Information    None           The patient is asked to follow-up with their primary care provider in the next several days. They are to call tomorrow for an appointment. The patient is asked to return promptly for any increased concerns or worsening of symptoms. They can return to this emergency department or any other emergency department.     Kim Iglesias NP  7:07 PM

## 2022-06-08 NOTE — ED TRIAGE NOTES
Pt reports R hip pain that radiates down her R leg with accompanying leg numbness and R foot swelling x4 days. Pt denies fall/injury. Pt has hx of arthritis.

## 2022-12-28 ENCOUNTER — APPOINTMENT (OUTPATIENT)
Dept: VASCULAR SURGERY | Age: 67
End: 2022-12-28
Attending: NURSE PRACTITIONER
Payer: MEDICARE

## 2022-12-28 ENCOUNTER — HOSPITAL ENCOUNTER (EMERGENCY)
Age: 67
Discharge: HOME | End: 2022-12-28
Attending: EMERGENCY MEDICINE
Payer: MEDICARE

## 2022-12-28 ENCOUNTER — APPOINTMENT (OUTPATIENT)
Dept: GENERAL RADIOLOGY | Age: 67
End: 2022-12-28
Attending: NURSE PRACTITIONER
Payer: MEDICARE

## 2022-12-28 VITALS
HEART RATE: 77 BPM | RESPIRATION RATE: 20 BRPM | SYSTOLIC BLOOD PRESSURE: 124 MMHG | HEIGHT: 59 IN | WEIGHT: 162 LBS | BODY MASS INDEX: 32.66 KG/M2 | TEMPERATURE: 98.2 F | OXYGEN SATURATION: 98 % | DIASTOLIC BLOOD PRESSURE: 76 MMHG

## 2022-12-28 DIAGNOSIS — M79.605 LEFT LEG PAIN: ICD-10-CM

## 2022-12-28 DIAGNOSIS — R10.32 LEFT INGUINAL PAIN: ICD-10-CM

## 2022-12-28 DIAGNOSIS — M25.559 HIP PAIN: Primary | ICD-10-CM

## 2022-12-28 LAB
ALBUMIN SERPL-MCNC: 3.8 G/DL (ref 3.5–5)
ALBUMIN/GLOB SERPL: 1.1 {RATIO} (ref 1.1–2.2)
ALP SERPL-CCNC: 93 U/L (ref 45–117)
ALT SERPL-CCNC: 17 U/L (ref 12–78)
ANION GAP SERPL CALC-SCNC: 2 MMOL/L (ref 5–15)
APPEARANCE UR: CLEAR
AST SERPL-CCNC: 8 U/L (ref 15–37)
BACTERIA URNS QL MICRO: NEGATIVE /HPF
BASOPHILS # BLD: 0 K/UL (ref 0–0.1)
BASOPHILS NFR BLD: 1 % (ref 0–1)
BILIRUB SERPL-MCNC: 0.2 MG/DL (ref 0.2–1)
BILIRUB UR QL: NEGATIVE
BUN SERPL-MCNC: 27 MG/DL (ref 6–20)
BUN/CREAT SERPL: 19 (ref 12–20)
CALCIUM SERPL-MCNC: 8.9 MG/DL (ref 8.5–10.1)
CHLORIDE SERPL-SCNC: 109 MMOL/L (ref 97–108)
CO2 SERPL-SCNC: 28 MMOL/L (ref 21–32)
COLOR UR: NORMAL
COMMENT, HOLDF: NORMAL
CREAT SERPL-MCNC: 1.44 MG/DL (ref 0.55–1.02)
DIFFERENTIAL METHOD BLD: ABNORMAL
EOSINOPHIL # BLD: 0.1 K/UL (ref 0–0.4)
EOSINOPHIL NFR BLD: 2 % (ref 0–7)
EPITH CASTS URNS QL MICRO: NORMAL /LPF
ERYTHROCYTE [DISTWIDTH] IN BLOOD BY AUTOMATED COUNT: 14.7 % (ref 11.5–14.5)
GLOBULIN SER CALC-MCNC: 3.5 G/DL (ref 2–4)
GLUCOSE SERPL-MCNC: 105 MG/DL (ref 65–100)
GLUCOSE UR STRIP.AUTO-MCNC: NEGATIVE MG/DL
HCT VFR BLD AUTO: 34.1 % (ref 35–47)
HGB BLD-MCNC: 10.4 G/DL (ref 11.5–16)
HGB UR QL STRIP: NEGATIVE
HYALINE CASTS URNS QL MICRO: NORMAL /LPF (ref 0–5)
IMM GRANULOCYTES # BLD AUTO: 0 K/UL (ref 0–0.04)
IMM GRANULOCYTES NFR BLD AUTO: 0 % (ref 0–0.5)
KETONES UR QL STRIP.AUTO: NEGATIVE MG/DL
LEUKOCYTE ESTERASE UR QL STRIP.AUTO: NEGATIVE
LYMPHOCYTES # BLD: 2.4 K/UL (ref 0.8–3.5)
LYMPHOCYTES NFR BLD: 43 % (ref 12–49)
MCH RBC QN AUTO: 26.7 PG (ref 26–34)
MCHC RBC AUTO-ENTMCNC: 30.5 G/DL (ref 30–36.5)
MCV RBC AUTO: 87.4 FL (ref 80–99)
MONOCYTES # BLD: 0.6 K/UL (ref 0–1)
MONOCYTES NFR BLD: 12 % (ref 5–13)
NEUTS SEG # BLD: 2.3 K/UL (ref 1.8–8)
NEUTS SEG NFR BLD: 42 % (ref 32–75)
NITRITE UR QL STRIP.AUTO: NEGATIVE
NRBC # BLD: 0 K/UL (ref 0–0.01)
NRBC BLD-RTO: 0 PER 100 WBC
PH UR STRIP: 6.5 [PH] (ref 5–8)
PLATELET # BLD AUTO: 261 K/UL (ref 150–400)
PMV BLD AUTO: 8.8 FL (ref 8.9–12.9)
POTASSIUM SERPL-SCNC: 4.7 MMOL/L (ref 3.5–5.1)
PROT SERPL-MCNC: 7.3 G/DL (ref 6.4–8.2)
PROT UR STRIP-MCNC: NEGATIVE MG/DL
RBC # BLD AUTO: 3.9 M/UL (ref 3.8–5.2)
RBC #/AREA URNS HPF: NORMAL /HPF (ref 0–5)
SAMPLES BEING HELD,HOLD: NORMAL
SODIUM SERPL-SCNC: 139 MMOL/L (ref 136–145)
SP GR UR REFRACTOMETRY: 1.02 (ref 1–1.03)
UR CULT HOLD, URHOLD: NORMAL
UROBILINOGEN UR QL STRIP.AUTO: 0.2 EU/DL (ref 0.2–1)
WBC # BLD AUTO: 5.4 K/UL (ref 3.6–11)
WBC URNS QL MICRO: NORMAL /HPF (ref 0–4)

## 2022-12-28 PROCEDURE — 74011000250 HC RX REV CODE- 250: Performed by: NURSE PRACTITIONER

## 2022-12-28 PROCEDURE — 74011250637 HC RX REV CODE- 250/637: Performed by: NURSE PRACTITIONER

## 2022-12-28 PROCEDURE — 74011636637 HC RX REV CODE- 636/637: Performed by: NURSE PRACTITIONER

## 2022-12-28 PROCEDURE — 73502 X-RAY EXAM HIP UNI 2-3 VIEWS: CPT

## 2022-12-28 PROCEDURE — 74011250636 HC RX REV CODE- 250/636: Performed by: NURSE PRACTITIONER

## 2022-12-28 PROCEDURE — 80053 COMPREHEN METABOLIC PANEL: CPT

## 2022-12-28 PROCEDURE — 81001 URINALYSIS AUTO W/SCOPE: CPT

## 2022-12-28 PROCEDURE — 36415 COLL VENOUS BLD VENIPUNCTURE: CPT

## 2022-12-28 PROCEDURE — 93971 EXTREMITY STUDY: CPT

## 2022-12-28 PROCEDURE — 85025 COMPLETE CBC W/AUTO DIFF WBC: CPT

## 2022-12-28 PROCEDURE — 99284 EMERGENCY DEPT VISIT MOD MDM: CPT

## 2022-12-28 PROCEDURE — A9270 NON-COVERED ITEM OR SERVICE: HCPCS | Performed by: NURSE PRACTITIONER

## 2022-12-28 RX ORDER — PREDNISONE 5 MG/1
TABLET ORAL
Qty: 21 TABLET | Refills: 0 | Status: SHIPPED | OUTPATIENT
Start: 2022-12-28

## 2022-12-28 RX ORDER — LIDOCAINE 4 G/100G
1 PATCH TOPICAL EVERY 24 HOURS
Status: DISCONTINUED | OUTPATIENT
Start: 2022-12-28 | End: 2022-12-28 | Stop reason: HOSPADM

## 2022-12-28 RX ORDER — ACETAMINOPHEN 325 MG/1
650 TABLET ORAL
Status: COMPLETED | OUTPATIENT
Start: 2022-12-28 | End: 2022-12-28

## 2022-12-28 RX ORDER — ONDANSETRON 4 MG/1
4 TABLET, ORALLY DISINTEGRATING ORAL
Status: COMPLETED | OUTPATIENT
Start: 2022-12-28 | End: 2022-12-28

## 2022-12-28 RX ORDER — LIDOCAINE 50 MG/G
PATCH TOPICAL
Qty: 1 EACH | Refills: 0 | Status: SHIPPED | OUTPATIENT
Start: 2022-12-28

## 2022-12-28 RX ORDER — CYCLOBENZAPRINE HCL 10 MG
10 TABLET ORAL
Status: COMPLETED | OUTPATIENT
Start: 2022-12-28 | End: 2022-12-28

## 2022-12-28 RX ADMIN — CYCLOBENZAPRINE 10 MG: 10 TABLET, FILM COATED ORAL at 17:42

## 2022-12-28 RX ADMIN — PREDNISONE 50 MG: 20 TABLET ORAL at 15:59

## 2022-12-28 RX ADMIN — ACETAMINOPHEN 650 MG: 325 TABLET ORAL at 15:58

## 2022-12-28 RX ADMIN — ONDANSETRON 4 MG: 4 TABLET, ORALLY DISINTEGRATING ORAL at 15:59

## 2022-12-28 NOTE — ED NOTES
NP has reviewed discharge instructions with the patient. The patient verbalized understanding. The patient left the Emergency Department ambulatory, alert and oriented and in no acute distress. The patient was encouraged to call or return to the ED for worsening issues or problems and was encouraged to schedule a follow up appointment for continuing care.      The patient verbalized understanding of discharge instructions and prescriptions, all questions were answered. The patient has no further concerns at this time.

## 2022-12-28 NOTE — DISCHARGE INSTRUCTIONS
You were seen in the emergency department today for hip and groin pain. While you were here we ruled out a fracture/blood clot to the leg. Please see the attached information for the results of your testing. I recommend follow-up with an orthopedic specialist cyst for further specialty testing. You should follow-up with your primary care provider in the next couple of days. You can take over the over-the-counter medications that we discussed for your symptoms. Return if you develop any difficulty breathing, chest pain, or any other new or concerning symptoms.

## 2022-12-28 NOTE — ED PROVIDER NOTES
HPI   Patient is a 78 y.o. F who presents today with complaints of left groin and hip pain. Patient states that this pain has been going on for 2 weeks. She first noticed in her left groin and then radiated to her right back, and now moving down the leg to her foot. She states it is starting to affect her balance due to walk. Denies cauda equina symptoms. Denies dysuria frequency or urgency. Has not tried anything over-the-counter for her pain. Denies any trauma or injury. States she is no longer taking blood thinners. There are no other complaints, changes or physical findings at this time. PMH: Diabetes, hypertension, pancreatitis, CKD stage III  Surgical History: Liver transplant  Smoking: None  Alcohol: None  Drug Use: None  ALLERGIES: Latex and Morphine    Past Medical History:   Diagnosis Date    Diabetes ([facility] [address]     Hypertension     Pancreatitis      Past Surgical History:   Procedure Laterality Date    HX CHOLECYSTECTOMY      HX HERNIA REPAIR      HX HYSTERECTOMY      HX ORTHOPAEDIC      right shoulder    HX ORTHOPAEDIC      left shoulder    HX TUBAL LIGATION      NEUROLOGICAL PROCEDURE UNLISTED      cervical fusion     No family history on file.   Social History     Socioeconomic History    Marital status: SINGLE     Spouse name: Not on file    Number of children: Not on file    Years of education: Not on file    Highest education level: Not on file   Occupational History    Not on file   Tobacco Use    Smoking status: Former     Packs/day: 0.50     Types: Cigarettes    Smokeless tobacco: Never   Substance and Sexual Activity    Alcohol use: No    Drug use: No    Sexual activity: Not on file   Other Topics Concern    Not on file   Social History Narrative    Not on file     Social Determinants of Health     Financial Resource Strain: Not on file   Food Insecurity: Not on file   Transportation Needs: Not on file   Physical Activity: Not on file   Stress: Not on file   Social Connections: Not on file   Intimate Partner Violence: Not on file   Housing Stability: Not on file           Review of Systems   Constitutional:  Negative for fever. HENT:  Negative for congestion. Eyes:  Negative for discharge. Respiratory:  Negative for shortness of breath. Cardiovascular:  Negative for chest pain. Gastrointestinal:  Negative for nausea. Genitourinary:  Negative for dysuria. Musculoskeletal:  Positive for myalgias. Neurological:  Negative for seizures. Psychiatric/Behavioral:  Negative for behavioral problems. Vitals:    12/28/22 1311   BP: 124/76   Pulse: 77   Resp: 20   Temp: 98.2 °F (36.8 °C)   SpO2: 98%   Weight: 73.5 kg (162 lb)   Height: 4' 11\" (1.499 m)            Physical Exam  Exam conducted with a chaperone present. Constitutional:       Appearance: Normal appearance. HENT:      Head: Normocephalic and atraumatic. Eyes:      Pupils: Pupils are equal, round, and reactive to light. Cardiovascular:      Rate and Rhythm: Normal rate and regular rhythm. Heart sounds: Normal heart sounds. Pulmonary:      Effort: Pulmonary effort is normal.      Breath sounds: Normal breath sounds. Abdominal:      General: Abdomen is flat. Palpations: Abdomen is soft. Genitourinary:     Comments: Left groin examined with nurse present. No signs of abscess, or bulge concerning for hernia. Musculoskeletal:      Cervical back: Neck supple. Skin:     General: Skin is dry. Neurological:      Mental Status: She is alert. Mental status is at baseline.    Psychiatric:         Mood and Affect: Mood normal.         Behavior: Behavior normal.            LABORATORY RESULTS:  Recent Results (from the past 24 hour(s))   CBC WITH AUTOMATED DIFF    Collection Time: 12/28/22  1:59 PM   Result Value Ref Range    WBC 5.4 3.6 - 11.0 K/uL    RBC 3.90 3.80 - 5.20 M/uL    HGB 10.4 (L) 11.5 - 16.0 g/dL    HCT 34.1 (L) 35.0 - 47.0 %    MCV 87.4 80.0 - 99.0 FL    MCH 26.7 26.0 - 34.0 PG    MCHC 30.5 30.0 - 36.5 g/dL    RDW 14.7 (H) 11.5 - 14.5 %    PLATELET 288 611 - 658 K/uL    MPV 8.8 (L) 8.9 - 12.9 FL    NRBC 0.0 0  WBC    ABSOLUTE NRBC 0.00 0.00 - 0.01 K/uL    NEUTROPHILS 42 32 - 75 %    LYMPHOCYTES 43 12 - 49 %    MONOCYTES 12 5 - 13 %    EOSINOPHILS 2 0 - 7 %    BASOPHILS 1 0 - 1 %    IMMATURE GRANULOCYTES 0 0.0 - 0.5 %    ABS. NEUTROPHILS 2.3 1.8 - 8.0 K/UL    ABS. LYMPHOCYTES 2.4 0.8 - 3.5 K/UL    ABS. MONOCYTES 0.6 0.0 - 1.0 K/UL    ABS. EOSINOPHILS 0.1 0.0 - 0.4 K/UL    ABS. BASOPHILS 0.0 0.0 - 0.1 K/UL    ABS. IMM. GRANS. 0.0 0.00 - 0.04 K/UL    DF AUTOMATED     METABOLIC PANEL, COMPREHENSIVE    Collection Time: 12/28/22  1:59 PM   Result Value Ref Range    Sodium 139 136 - 145 mmol/L    Potassium 4.7 3.5 - 5.1 mmol/L    Chloride 109 (H) 97 - 108 mmol/L    CO2 28 21 - 32 mmol/L    Anion gap 2 (L) 5 - 15 mmol/L    Glucose 105 (H) 65 - 100 mg/dL    BUN 27 (H) 6 - 20 MG/DL    Creatinine 1.44 (H) 0.55 - 1.02 MG/DL    BUN/Creatinine ratio 19 12 - 20      eGFR 40 (L) >60 ml/min/1.73m2    Calcium 8.9 8.5 - 10.1 MG/DL    Bilirubin, total 0.2 0.2 - 1.0 MG/DL    ALT (SGPT) 17 12 - 78 U/L    AST (SGOT) 8 (L) 15 - 37 U/L    Alk. phosphatase 93 45 - 117 U/L    Protein, total 7.3 6.4 - 8.2 g/dL    Albumin 3.8 3.5 - 5.0 g/dL    Globulin 3.5 2.0 - 4.0 g/dL    A-G Ratio 1.1 1.1 - 2.2     SAMPLES BEING HELD    Collection Time: 12/28/22  1:59 PM   Result Value Ref Range    SAMPLES BEING HELD 1RED     COMMENT        Add-on orders for these samples will be processed based on acceptable specimen integrity and analyte stability, which may vary by analyte.    URINALYSIS W/MICROSCOPIC    Collection Time: 12/28/22  5:04 PM   Result Value Ref Range    Color YELLOW/STRAW      Appearance CLEAR CLEAR      Specific gravity 1.016 1.003 - 1.030      pH (UA) 6.5 5.0 - 8.0      Protein Negative NEG mg/dL    Glucose Negative NEG mg/dL    Ketone Negative NEG mg/dL    Bilirubin Negative NEG      Blood Negative NEG      Urobilinogen 0.2 0.2 - 1.0 EU/dL    Nitrites Negative NEG      Leukocyte Esterase Negative NEG      WBC 0-4 0 - 4 /hpf    RBC 0-5 0 - 5 /hpf    Epithelial cells FEW FEW /lpf    Bacteria Negative NEG /hpf    Hyaline cast 0-2 0 - 5 /lpf   URINE CULTURE HOLD SAMPLE    Collection Time: 12/28/22  5:04 PM    Specimen: Urine   Result Value Ref Range    Urine culture hold        Urine on hold in Microbiology dept for 2 days. If unpreserved urine is submitted, it cannot be used for addtional testing after 24 hours, recollection will be required. IMAGING RESULTS:  XR HIP LT W OR WO PELV 2-3 VWS    Result Date: 12/28/2022  No acute abnormality. MEDICATIONS GIVEN:  Medications   lidocaine 4 % patch 1 Patch (1 Patch TransDERmal Apply Patch 12/28/22 1605)   ondansetron (ZOFRAN ODT) tablet 4 mg (4 mg Oral Given 12/28/22 1559)   predniSONE (DELTASONE) tablet 50 mg (50 mg Oral Given 12/28/22 1559)   acetaminophen (TYLENOL) tablet 650 mg (650 mg Oral Given 12/28/22 1558)   cyclobenzaprine (FLEXERIL) tablet 10 mg (10 mg Oral Given 12/28/22 1742)            MDM  Number of Diagnoses or Management Options  Hip pain  Left inguinal pain  Left leg pain  Diagnosis management comments: This patient on tacrolimus, at increased risk for osteoporosis, therefore I did obtain an x-ray to rule out any microfractures despite no evidence of trauma. X-ray was unremarkable for fractures or dislocations that could be causing pain. As patient does have history of low there are pathology, I wanted to rule out any hypercoagulable/blood clotting and groin/leg. And therefore venous duplex was ordered. Which ruled out any sort of blood clot. I examined the patient's groin but did not have any evidence of abscess or hernia. I think that the patient's pain is likely in on urgent or emergent etiology, likely musculoskeletal or related to a sciatica process.   I have instructed the patient to follow-up with an orthopedic surgeon for further imaging and specialty work-up. I have treated her pain while in the department with prednisone, Tylenol, muscle relaxers. Unfortunately due to patient's CKD she has not a candidate for anti-inflammatory medications which I have explained to her. Presentation, management, and disposition were discussed with the attending physician, Dr. Jazmin Aguayo, who is in agreement with plan of care. Discussed results and work-up with patient and answered all questions, the patient expresses understanding and agrees with the care plan and disposition. The patient was given an opportunity to ask questions and all concerns raised were addressed prior to discharge. Recommended patient follow-up with provider as listed below. Counseled patient on standard home and self-care measures. Specifically explained the emergent conditions that could arise and clearly instructed the patient to return to the emergency department for those and any other new, worsening, or concerning symptoms. Patient stable and ready for discharge. IMPRESSION:  1. Hip pain    2. Left inguinal pain    3.  Left leg pain        DISPOSITION:  Discharge    PLAN:  Follow-up Information       Follow up With Specialties Details Why Contact Info    Lesley Esquivel MD Internal Medicine Physician Schedule an appointment as soon as possible for a visit   [address] [facility]  Schedule an appointment as soon as possible for a visit   [id number] [place] [address]    [address] [facility] [address] [facility]  As needed, If symptoms worsen [address] [facility]  [phone]          Discharge Medication List as of 12/28/2022  5:10 PM        START taking these medications    Details   predniSONE (STERAPRED) 5 mg dose pack See administration instruction per 5mg dose pack, Normal, Disp-21 Tablet, R-0      lidocaine (LIDODERM) 5 % Apply patch to the affected area for 12 hours a day and remove for 12 hours a day., Normal, Disp-1 Each, R-0           CONTINUE these medications which have NOT CHANGED    Details   cyclobenzaprine (FLEXERIL) 10 mg tablet Take 1 Tablet by mouth three (3) times daily as needed for Muscle Spasm(s). , Print, Disp-10 Tablet, R-0      ondansetron hcl (Zofran) 4 mg tablet Take 1 Tablet by mouth every eight (8) hours as needed for Nausea or Vomiting for up to 8 doses. , Normal, Disp-8 Tablet, R-0      gabapentin (NEURONTIN) 300 mg capsule Take 1 Cap by mouth three (3) times daily. Max Daily Amount: 900 mg., Print, Disp-18 Cap, R-0      magnesium oxide (MAG-OX) 400 mg tablet Take 400 mg by mouth two (2) times a day., Historical Med      aspirin delayed-release 81 mg tablet Take 81 mg by mouth daily. , Historical Med      clopidogrel (PLAVIX) 75 mg tab Take 75 mg by mouth daily. , Historical Med      !! lipase-protease-amylase (CREON) 36,000-114,000- 180,000 unit cpDR capsule Take 2 Caps by mouth three (3) times daily (with meals). , Historical Med      !! lipase-protease-amylase (CREON) 36,000-114,000- 180,000 unit cpDR capsule Take 1 Cap by mouth as needed (snacks). , Historical Med      metoprolol succinate (TOPROL-XL) 25 mg XL tablet Take 25 mg by mouth daily. , Historical Med      traMADol (ULTRAM) 50 mg tablet Take 50 mg by mouth every six (6) hours as needed for Pain., Historical Med      albuterol (PROVENTIL HFA, VENTOLIN HFA) 90 mcg/actuation inhaler Take 2 puffs by inhalation every four (4) hours as needed for Wheezing., Print, Disp-1 Inhaler, R-1      lisinopril (PRINIVIL, ZESTRIL) 40 mg tablet Take 40 mg by mouth daily. , Historical Med      metFORMIN (GLUCOPHAGE) 500 mg tablet Take 500 mg by mouth two (2) times daily (with meals). , Historical Med      ondansetron (ZOFRAN ODT) 4 mg disintegrating tablet Take 1 Tab by mouth every eight (8) hours as needed for Nausea. , Print, Disp-15 Tab, R-0       !! - Potential duplicate medications found. Please discuss with provider. Please note that this dictation was completed with Needle, the computer voice recognition software. Quite often unanticipated grammatical, syntax, homophones, and other interpretive errors are inadvertently transcribed by the computer software. Please disregard these errors. Please excuse any errors that have escaped final proofreading.

## 2023-05-17 RX ORDER — LISINOPRIL 40 MG/1
40 TABLET ORAL DAILY
COMMUNITY

## 2023-05-17 RX ORDER — ASPIRIN 81 MG/1
81 TABLET ORAL DAILY
COMMUNITY

## 2023-05-17 RX ORDER — ONDANSETRON 4 MG/1
4 TABLET, FILM COATED ORAL EVERY 8 HOURS PRN
COMMUNITY
Start: 2022-06-08

## 2023-05-17 RX ORDER — LIDOCAINE 50 MG/G
PATCH TOPICAL
COMMUNITY
Start: 2022-12-28

## 2023-05-17 RX ORDER — GABAPENTIN 300 MG/1
300 CAPSULE ORAL 3 TIMES DAILY
COMMUNITY
Start: 2020-01-31

## 2023-05-17 RX ORDER — CYCLOBENZAPRINE HCL 10 MG
10 TABLET ORAL 3 TIMES DAILY PRN
COMMUNITY
Start: 2022-06-08

## 2023-05-17 RX ORDER — CLOPIDOGREL BISULFATE 75 MG/1
75 TABLET ORAL DAILY
COMMUNITY

## 2023-05-17 RX ORDER — ALBUTEROL SULFATE 90 UG/1
2 AEROSOL, METERED RESPIRATORY (INHALATION) EVERY 4 HOURS PRN
COMMUNITY
Start: 2014-09-16

## 2023-05-17 RX ORDER — ONDANSETRON 4 MG/1
4 TABLET, ORALLY DISINTEGRATING ORAL EVERY 8 HOURS PRN
COMMUNITY
Start: 2013-11-07

## 2023-05-17 RX ORDER — MAGNESIUM OXIDE 400 MG/1
400 TABLET ORAL 2 TIMES DAILY
COMMUNITY

## 2023-05-17 RX ORDER — METOPROLOL SUCCINATE 25 MG/1
25 TABLET, EXTENDED RELEASE ORAL DAILY
COMMUNITY

## 2023-05-17 RX ORDER — PREDNISONE 5 MG/1
TABLET ORAL
COMMUNITY
Start: 2022-12-28

## 2023-05-17 RX ORDER — TRAMADOL HYDROCHLORIDE 50 MG/1
50 TABLET ORAL EVERY 6 HOURS PRN
COMMUNITY